# Patient Record
Sex: FEMALE | Race: BLACK OR AFRICAN AMERICAN | Employment: OTHER | URBAN - METROPOLITAN AREA
[De-identification: names, ages, dates, MRNs, and addresses within clinical notes are randomized per-mention and may not be internally consistent; named-entity substitution may affect disease eponyms.]

---

## 2017-05-03 ENCOUNTER — OP HISTORICAL/CONVERTED ENCOUNTER (OUTPATIENT)
Dept: OTHER | Age: 72
End: 2017-05-03

## 2017-05-17 ENCOUNTER — OP HISTORICAL/CONVERTED ENCOUNTER (OUTPATIENT)
Dept: OTHER | Age: 72
End: 2017-05-17

## 2017-05-31 ENCOUNTER — OP HISTORICAL/CONVERTED ENCOUNTER (OUTPATIENT)
Dept: OTHER | Age: 72
End: 2017-05-31

## 2017-11-01 ENCOUNTER — OP HISTORICAL/CONVERTED ENCOUNTER (OUTPATIENT)
Dept: OTHER | Age: 72
End: 2017-11-01

## 2017-11-30 ENCOUNTER — OP HISTORICAL/CONVERTED ENCOUNTER (OUTPATIENT)
Dept: OTHER | Age: 72
End: 2017-11-30

## 2018-02-06 ENCOUNTER — OP HISTORICAL/CONVERTED ENCOUNTER (OUTPATIENT)
Dept: OTHER | Age: 73
End: 2018-02-06

## 2018-06-14 ENCOUNTER — OP HISTORICAL/CONVERTED ENCOUNTER (OUTPATIENT)
Dept: OTHER | Age: 73
End: 2018-06-14

## 2019-01-14 ENCOUNTER — OP HISTORICAL/CONVERTED ENCOUNTER (OUTPATIENT)
Dept: OTHER | Age: 74
End: 2019-01-14

## 2019-01-17 ENCOUNTER — OP HISTORICAL/CONVERTED ENCOUNTER (OUTPATIENT)
Dept: OTHER | Age: 74
End: 2019-01-17

## 2019-01-24 LAB — COLONOSCOPY, EXTERNAL: NORMAL

## 2019-02-08 ENCOUNTER — OP HISTORICAL/CONVERTED ENCOUNTER (OUTPATIENT)
Dept: OTHER | Age: 74
End: 2019-02-08

## 2019-03-04 ENCOUNTER — OP HISTORICAL/CONVERTED ENCOUNTER (OUTPATIENT)
Dept: OTHER | Age: 74
End: 2019-03-04

## 2019-03-20 ENCOUNTER — OP HISTORICAL/CONVERTED ENCOUNTER (OUTPATIENT)
Dept: OTHER | Age: 74
End: 2019-03-20

## 2019-04-04 ENCOUNTER — OP HISTORICAL/CONVERTED ENCOUNTER (OUTPATIENT)
Dept: OTHER | Age: 74
End: 2019-04-04

## 2019-05-20 LAB — HBA1C MFR BLD HPLC: 6.5 %

## 2019-07-24 LAB — MAMMOGRAPHY, EXTERNAL: NORMAL

## 2020-07-17 ENCOUNTER — OP HISTORICAL/CONVERTED ENCOUNTER (OUTPATIENT)
Dept: OTHER | Age: 75
End: 2020-07-17

## 2020-07-17 LAB — CREATININE, EXTERNAL: 0.92

## 2020-07-23 VITALS
DIASTOLIC BLOOD PRESSURE: 66 MMHG | SYSTOLIC BLOOD PRESSURE: 136 MMHG | WEIGHT: 233 LBS | BODY MASS INDEX: 43.99 KG/M2 | HEIGHT: 61 IN

## 2020-07-23 PROBLEM — M35.3 POLYMYALGIA RHEUMATICA (HCC): Status: ACTIVE | Noted: 2020-07-23

## 2020-07-23 PROBLEM — I10 ESSENTIAL HYPERTENSION: Status: ACTIVE | Noted: 2020-07-23

## 2020-07-23 PROBLEM — Z12.39 SCREENING FOR MALIGNANT NEOPLASM OF BREAST: Status: ACTIVE | Noted: 2020-07-23

## 2020-07-23 PROBLEM — E66.01 MORBID OBESITY (HCC): Status: ACTIVE | Noted: 2020-07-23

## 2020-07-23 PROBLEM — E55.9 VITAMIN D DEFICIENCY: Status: ACTIVE | Noted: 2020-07-23

## 2020-07-23 PROBLEM — M54.30 CHRONIC SCIATICA: Status: ACTIVE | Noted: 2020-07-23

## 2020-07-23 PROBLEM — H57.89 DISCHARGE OF EYE: Status: ACTIVE | Noted: 2020-07-23

## 2020-07-23 PROBLEM — K21.9 GASTROESOPHAGEAL REFLUX DISEASE: Status: ACTIVE | Noted: 2020-07-23

## 2020-07-23 PROBLEM — M79.601 PAIN IN RIGHT ARM: Status: ACTIVE | Noted: 2020-07-23

## 2020-07-23 PROBLEM — M48.061 SPINAL STENOSIS OF LUMBAR REGION: Status: ACTIVE | Noted: 2020-07-23

## 2020-07-23 PROBLEM — M17.9 OSTEOARTHRITIS OF KNEE: Status: ACTIVE | Noted: 2020-07-23

## 2020-07-23 PROBLEM — E66.01 SEVERE OBESITY (HCC): Status: ACTIVE | Noted: 2020-07-23

## 2020-07-23 PROBLEM — M15.9 DEGENERATIVE JOINT DISEASE INVOLVING MULTIPLE JOINTS: Status: ACTIVE | Noted: 2020-07-23

## 2020-07-23 PROBLEM — G47.30 SLEEP APNEA: Status: ACTIVE | Noted: 2020-07-23

## 2020-07-23 PROBLEM — M25.511 PAIN IN JOINT OF RIGHT SHOULDER: Status: ACTIVE | Noted: 2020-07-23

## 2020-07-23 PROBLEM — E11.9 DIET-CONTROLLED TYPE 2 DIABETES MELLITUS (HCC): Status: ACTIVE | Noted: 2020-07-23

## 2020-07-23 PROBLEM — M79.639 PAIN IN FOREARM: Status: ACTIVE | Noted: 2020-07-23

## 2020-07-23 PROBLEM — M54.2 NECK PAIN: Status: ACTIVE | Noted: 2020-07-23

## 2020-07-23 PROBLEM — M15.9 OSTEOARTHRITIS OF MULTIPLE JOINTS: Status: ACTIVE | Noted: 2020-07-23

## 2020-07-23 PROBLEM — T75.3XXA MOTION SICKNESS: Status: ACTIVE | Noted: 2020-07-23

## 2020-07-23 PROBLEM — R92.8 MAMMOGRAM ABNORMAL: Status: ACTIVE | Noted: 2020-07-23

## 2020-07-23 PROBLEM — Z79.52 LONG TERM CURRENT USE OF SYSTEMIC STEROIDS: Status: ACTIVE | Noted: 2020-07-23

## 2020-07-23 PROBLEM — T14.8XXA WOUND OF SKIN: Status: ACTIVE | Noted: 2020-07-23

## 2020-07-23 PROBLEM — Z13.220 SCREENING FOR CHOLESTEROL LEVEL: Status: ACTIVE | Noted: 2020-07-23

## 2020-07-23 RX ORDER — MELOXICAM 15 MG/1
15 TABLET ORAL DAILY
COMMUNITY
End: 2020-11-16 | Stop reason: HOSPADM

## 2020-07-23 RX ORDER — LISINOPRIL AND HYDROCHLOROTHIAZIDE 12.5; 2 MG/1; MG/1
TABLET ORAL DAILY
COMMUNITY
End: 2020-10-31

## 2020-07-23 RX ORDER — ASPIRIN 81 MG/1
TABLET ORAL DAILY
COMMUNITY

## 2020-07-23 RX ORDER — AZATHIOPRINE 50 MG/1
50 TABLET ORAL 2 TIMES DAILY
COMMUNITY

## 2020-07-23 RX ORDER — DICLOFENAC SODIUM 10 MG/G
GEL TOPICAL 4 TIMES DAILY
COMMUNITY

## 2020-07-23 RX ORDER — GLUCOSAMINE SULFATE 1500 MG
POWDER IN PACKET (EA) ORAL DAILY
COMMUNITY

## 2020-07-23 RX ORDER — PREDNISONE 5 MG/1
TABLET ORAL
COMMUNITY
End: 2020-08-13

## 2020-08-13 ENCOUNTER — OFFICE VISIT (OUTPATIENT)
Dept: INTERNAL MEDICINE CLINIC | Age: 75
End: 2020-08-13
Payer: MEDICARE

## 2020-08-13 VITALS
HEART RATE: 72 BPM | OXYGEN SATURATION: 98 % | HEIGHT: 61 IN | WEIGHT: 235.6 LBS | SYSTOLIC BLOOD PRESSURE: 128 MMHG | TEMPERATURE: 98.2 F | BODY MASS INDEX: 44.48 KG/M2 | RESPIRATION RATE: 18 BRPM | DIASTOLIC BLOOD PRESSURE: 74 MMHG

## 2020-08-13 VITALS
WEIGHT: 233 LBS | HEART RATE: 84 BPM | HEIGHT: 61 IN | BODY MASS INDEX: 43.99 KG/M2 | DIASTOLIC BLOOD PRESSURE: 66 MMHG | SYSTOLIC BLOOD PRESSURE: 136 MMHG | TEMPERATURE: 98 F | RESPIRATION RATE: 16 BRPM | OXYGEN SATURATION: 100 %

## 2020-08-13 DIAGNOSIS — E11.9 DIET-CONTROLLED TYPE 2 DIABETES MELLITUS (HCC): ICD-10-CM

## 2020-08-13 DIAGNOSIS — J30.9 ALLERGIC RHINITIS, UNSPECIFIED SEASONALITY, UNSPECIFIED TRIGGER: ICD-10-CM

## 2020-08-13 DIAGNOSIS — G47.30 SLEEP APNEA, UNSPECIFIED TYPE: ICD-10-CM

## 2020-08-13 DIAGNOSIS — I10 ESSENTIAL HYPERTENSION: Primary | ICD-10-CM

## 2020-08-13 DIAGNOSIS — M54.30 CHRONIC SCIATICA, UNSPECIFIED LATERALITY: ICD-10-CM

## 2020-08-13 DIAGNOSIS — M48.061 SPINAL STENOSIS OF LUMBAR REGION, UNSPECIFIED WHETHER NEUROGENIC CLAUDICATION PRESENT: ICD-10-CM

## 2020-08-13 DIAGNOSIS — E66.01 SEVERE OBESITY (HCC): ICD-10-CM

## 2020-08-13 DIAGNOSIS — M35.3 POLYMYALGIA RHEUMATICA (HCC): ICD-10-CM

## 2020-08-13 DIAGNOSIS — Z13.220 SCREENING FOR CHOLESTEROL LEVEL: ICD-10-CM

## 2020-08-13 DIAGNOSIS — M15.9 OSTEOARTHRITIS OF MULTIPLE JOINTS, UNSPECIFIED OSTEOARTHRITIS TYPE: ICD-10-CM

## 2020-08-13 DIAGNOSIS — E78.00 PURE HYPERCHOLESTEROLEMIA: ICD-10-CM

## 2020-08-13 PROCEDURE — 99214 OFFICE O/P EST MOD 30 MIN: CPT | Performed by: INTERNAL MEDICINE

## 2020-08-13 RX ORDER — CETIRIZINE HCL 10 MG
10 TABLET ORAL DAILY
Qty: 30 TAB | Refills: 2 | Status: SHIPPED | OUTPATIENT
Start: 2020-08-13 | End: 2021-04-20

## 2020-08-13 RX ORDER — PREGABALIN 75 MG/1
75 CAPSULE ORAL 2 TIMES DAILY
Qty: 60 CAP | Refills: 2 | Status: SHIPPED | OUTPATIENT
Start: 2020-08-13 | End: 2020-11-16 | Stop reason: SDUPTHER

## 2020-08-13 RX ORDER — FLUTICASONE PROPIONATE 50 MCG
2 SPRAY, SUSPENSION (ML) NASAL DAILY
Qty: 1 BOTTLE | Refills: 2 | Status: SHIPPED | OUTPATIENT
Start: 2020-08-13 | End: 2022-04-19

## 2020-08-13 NOTE — PROGRESS NOTES
Ezekiel Cuevas is a 76 y.o. female and presents with Hypertension; Diabetes; and Follow Up Chronic Condition    According to her she has chronic pain, all the time,In her back in both gluteal region according to her nothing helps. She already takes gabapentin 300 mg 3 times a day and in the past she had lumbar spinal stenosis. ,  She also has polymyalgia rheumatica but now she is off from steroids because she had elevated hemoglobin A1c and last hemoglobin A1c was 6.5% and she did not want to be on steroid. Now she is off from steroid. She is taking azathioprine. She does not take methotrexate. She is not able to do any exercise. Her blood pressure is controlled with current medication regimen. He has no depression. Her sister also has SLE. Patient also has DJD of joints. She is due for her blood work but she had her labs done recently in July with her rheumatologist.  According to her orthopedic surgeon Dr. Juan Gaxiola gave her a gel injection in the right knee joint and she will get 3 injections. CMP is stable but she had a mildly low hemoglobin. Her BMI is 44.5. I had a long discussion regarding use of pregabalin and she agreed if her insurance approves. She had her  Last visit with me in December 2019 and last hemoglobin A1c was 6.5% in May 2019. She has postnasal drainage and sometimes left ear fullness      Review of Systems   Constitutional: Negative for chills, fever, malaise/fatigue and weight loss. HENT: Negative for ear discharge, ear pain, hearing loss, sinus pain and sore throat. Postnasal drainage   Eyes: Negative for blurred vision. Respiratory: Negative for cough, shortness of breath and wheezing. Cardiovascular: Negative. Gastrointestinal: Negative for diarrhea, heartburn and nausea. Genitourinary: Negative for dysuria, flank pain, frequency and urgency. Musculoskeletal: Positive for back pain and joint pain.    Neurological: Negative for dizziness, tingling and sensory change. Psychiatric/Behavioral: Negative for depression. The patient is not nervous/anxious.          Past Medical History:   Diagnosis Date    Bronchitis     Chronic sciatica 7/23/2020    Degenerative joint disease involving multiple joints 7/23/2020    Diet-controlled type 2 diabetes mellitus ([facility] 7/23/2020    Discharge of eye 7/23/2020    Essential hypertension 7/23/2020    Gastroesophageal reflux disease 7/23/2020    Long term current use of systemic steroids 7/23/2020    Mammogram abnormal 7/23/2020    Morbid obesity ([facility] 7/23/2020    Motion sickness 7/23/2020    Neck pain 7/23/2020    Osteoarthritis of knee 7/23/2020    Osteoarthritis of multiple joints 7/23/2020    Pain in forearm 7/23/2020    Pain in joint of right shoulder 7/23/2020    Pain in right arm 7/23/2020    Polymyalgia rheumatica ([facility] [address] 7/23/2020    Screening for cholesterol level 7/23/2020    Screening for malignant neoplasm of breast 7/23/2020    Severe obesity ([facility] [address] 7/23/2020    Sleep apnea 7/23/2020    Spinal stenosis of lumbar region 7/23/2020    Vitamin D deficiency 7/23/2020    Wound of skin 7/23/2020     Past Surgical History:   Procedure Laterality Date    HX BREAST BIOPSY      HX COLONOSCOPY       Social History     Socioeconomic History    Marital status:      Spouse name: Not on file    Number of children: Not on file    Years of education: Not on file    Highest education level: Not on file   Tobacco Use    Smoking status: Never Smoker    Smokeless tobacco: Never Used   Substance and Sexual Activity    Alcohol use: Not Currently    Drug use: Never     Family History   Problem Relation Age of Onset    Heart Disease Mother     Diabetes Mother     Stroke Mother     Heart Disease Father     Stroke Father     Lupus Sister      Current Outpatient Medications   Medication Sig Dispense Refill    fluticasone propionate (FLONASE) 50 mcg/actuation nasal spray 2 Sprays by Both Nostrils route daily. 1 Bottle 2    cetirizine (ZYRTEC) 10 mg tablet Take 1 Tab by mouth daily. 30 Tab 2    pregabalin (LYRICA) 75 mg capsule Take 1 Cap by mouth two (2) times a day. Max Daily Amount: 150 mg. 60 Cap 2    aspirin delayed-release 81 mg tablet Take  by mouth daily.  azaTHIOprine (IMURAN) 50 mg tablet Take 50 mg by mouth daily.  folic acid/multivit-min/lutein (CENTRUM SILVER PO) Take  by mouth.  diclofenac (VOLTAREN) 1 % gel Apply  to affected area four (4) times daily.  lisinopril-hydroCHLOROthiazide (PRINZIDE, ZESTORETIC) 20-12.5 mg per tablet Take  by mouth daily.  meloxicam (MOBIC) 15 mg tablet Take 15 mg by mouth daily.  cholecalciferol (Vitamin D3) 25 mcg (1,000 unit) cap Take  by mouth daily. Allergies   Allergen Reactions    Bactrim [Sulfamethoprim] Itching       Objective:  Visit Vitals  /74 (BP 1 Location: Left arm, BP Patient Position: Sitting)   Pulse 72   Temp 98.2 °F (36.8 °C) (Temporal)   Resp 18   Ht 5' 1\" (1.549 m)   Wt 235 lb 9.6 oz (106.9 kg)   SpO2 98%   BMI 44.52 kg/m²       Physical Exam:   Constitutional: General Appearance: healthy-appearing / obese. Level of Distress: NAD. Ambulation: ambulating   Psychiatric: Mental Status: normal mood and affect and active and alert. Orientation: to time, place, and person. no agitation. ,normal eye contact. normal insight  Head: Head: normocephalic and atraumatic. Eyes: Pupils: PERRLA. Sclerae: non-icteric. Ear examination  Right ear mild wax. Left ear normal.  Neck: Neck: supple, trachea midline, and no masses. Lymph Nodes: no cervical LAD. Thyroid: no enlargement or nodules and non-tender. Lungs: Respiratory effort: no dyspnea. Auscultation: no wheezing, rales/crackles, or rhonchi and breath sounds normal and good air movement. Cardiovascular: Apical Impulse: not displaced. Heart Auscultation: normal S1 and S2; no murmurs, rubs, or gallops; and RRR. Neck vessels: no carotid bruits.  Pulses including femoral / pedal: normal throughout. Abdomen: Bowel Sounds: normal. Inspection and Palpation: no tenderness, guarding, or masses and soft and non-distended. Liver: non-tender and no hepatomegaly. Spleen: non-tender and no splenomegaly. Musculoskeletal[de-identified] Extremities: no edema,no varicosities. No Calf tenderness. Neurologic: Gait and Station: normal gait and station. Motor Strength normal right and left. Skin: Inspection and palpation: no rash, lesions, or ulcer. Results for orders placed or performed in visit on 08/13/20    MAMMOGRAPHY   Result Value Ref Range    Mammography, External repeat in 1 year    AMB EXT HGBA1C   Result Value Ref Range    Hemoglobin A1c, External 6.5    HM COLONOSCOPY   Result Value Ref Range    Colonoscopy, External repeat in 10 years        Assessment/Plan:    ICD-10-CM ICD-9-CM    1. Essential hypertension  I10 401.9 CBC WITH AUTOMATED DIFF      TSH 3RD GENERATION      CANCELED: METABOLIC PANEL, COMPREHENSIVE   2. Diet-controlled type 2 diabetes mellitus (HCC)  E11.9 250.00 HEMOGLOBIN A1C WITH EAG   3. Spinal stenosis of lumbar region, unspecified whether neurogenic claudication present  M48.061 724.02 pregabalin (LYRICA) 75 mg capsule   4. Allergic rhinitis, unspecified seasonality, unspecified trigger  J30.9 477.9    5. Polymyalgia rheumatica (HCC)  M35.3 725    6. Chronic sciatica, unspecified laterality  M54.30 724.3 pregabalin (LYRICA) 75 mg capsule   7. Osteoarthritis of multiple joints, unspecified osteoarthritis type  M15.9 715.89    8. Sleep apnea, unspecified type  G47.30 780.57    9. Severe obesity (HCC)  E66.01 278.01    10. Screening for cholesterol level  Z13.220 V77.91 LIPID PANEL   11.  Pure hypercholesterolemia  E78.00 272.0 LIPID PANEL     Orders Placed This Encounter    CBC WITH AUTOMATED DIFF    LIPID PANEL    HEMOGLOBIN A1C WITH EAG    TSH 3RD GENERATION    LIPID PANEL    fluticasone propionate (FLONASE) 50 mcg/actuation nasal spray Si Sprays by Both Nostrils route daily. Dispense:  1 Bottle     Refill:  2    cetirizine (ZYRTEC) 10 mg tablet     Sig: Take 1 Tab by mouth daily. Dispense:  30 Tab     Refill:  2    pregabalin (LYRICA) 75 mg capsule     Sig: Take 1 Cap by mouth two (2) times a day. Max Daily Amount: 150 mg. Dispense:  60 Cap     Refill:  2         hypertension controlled continue lisinopril hydrochlorothiazide 20/12.5 mg once a day diet low in salt. type 2 diabetes mellitus. hemoglobin A1c is 6.5% in the May, now she is off from steroids so I will repeat her hemoglobin A1c. Bilateral pain in gluteal region with back pain due to lumbar spinal stenosis and most likely due to lumbar radiculopathy, she is taking gabapentin 300 mg 3 times a day but she has constant resting pain and not improving but she is hesitating to change the medicine to Cymbalta or Lyrica after a long discussion she agreed to try low-dose of pregabalin I sent the refill to the pharmacy 75 mg twice a day to begin with and if she wants to stop she will stop gradually and side effects discussed. For postnasal drainage started on cetirizine and she can take fluticasone nasal spray. Slight wax in the right ear.    ,  Polymyalgia rheumatica  getting azathioprine 50 mg twice a day. . she could not take methotrexate, she is getting gabapentin. osteoarthritis in knee joints Symptomatic treatment. morbid obesity lifestyle modification. Needs regular blood work being on long-term use of azathioprine, she has morbid obesity but she is not able to lose weight because she cannot walk or do exercise due to chronic pain and she does not believe in taking more medications for pain relief, she can be benefited by physical therapy and stretching exercise, but she does not want to do for now.,  She can get opinion from her orthopedic surgeon that she is going for her related to the knee problems.   Concern     I reviewed her previous labs in previous encounters and discussed with her. I went in [place] and reviewed her medications and labs done by rheumatologist.  Follow-up in 3 months. Labs ordered. Screening for cholesterol ordered. lose weight, increase physical activity, follow low fat diet, routine labs ordered, call if any problems    There are no Patient Instructions on file for this visit. Follow-up and Dispositions    · Return in about 3 months (around 11/13/2020) for htn ,sciatica ,lumbar spinal stenosis ,multiple co morbidities ,fasting labs now.

## 2020-08-19 ENCOUNTER — OP HISTORICAL/CONVERTED ENCOUNTER (OUTPATIENT)
Dept: OTHER | Age: 75
End: 2020-08-19

## 2020-08-22 PROBLEM — Z12.39 SCREENING FOR MALIGNANT NEOPLASM OF BREAST: Status: RESOLVED | Noted: 2020-07-23 | Resolved: 2020-08-22

## 2020-08-22 PROBLEM — Z13.220 SCREENING FOR CHOLESTEROL LEVEL: Status: RESOLVED | Noted: 2020-07-23 | Resolved: 2020-08-22

## 2020-09-15 LAB
CHOLEST SERPL-MCNC: 192 MG/DL (ref 100–199)
HDLC SERPL-MCNC: 68 MG/DL
LDLC SERPL CALC-MCNC: 106 MG/DL (ref 0–99)
TRIGL SERPL-MCNC: 101 MG/DL (ref 0–149)
VLDLC SERPL CALC-MCNC: 18 MG/DL (ref 5–40)

## 2020-11-16 ENCOUNTER — OFFICE VISIT (OUTPATIENT)
Dept: INTERNAL MEDICINE CLINIC | Age: 75
End: 2020-11-16
Payer: MEDICARE

## 2020-11-16 VITALS
DIASTOLIC BLOOD PRESSURE: 74 MMHG | HEART RATE: 72 BPM | BODY MASS INDEX: 45.05 KG/M2 | SYSTOLIC BLOOD PRESSURE: 134 MMHG | OXYGEN SATURATION: 99 % | RESPIRATION RATE: 18 BRPM | HEIGHT: 61 IN | WEIGHT: 238.6 LBS

## 2020-11-16 DIAGNOSIS — R92.8 ABNORMAL MAMMOGRAM OF LEFT BREAST: ICD-10-CM

## 2020-11-16 DIAGNOSIS — M15.9 PRIMARY OSTEOARTHRITIS INVOLVING MULTIPLE JOINTS: ICD-10-CM

## 2020-11-16 DIAGNOSIS — M48.061 SPINAL STENOSIS OF LUMBAR REGION, UNSPECIFIED WHETHER NEUROGENIC CLAUDICATION PRESENT: ICD-10-CM

## 2020-11-16 DIAGNOSIS — M54.30 CHRONIC SCIATICA, UNSPECIFIED LATERALITY: ICD-10-CM

## 2020-11-16 DIAGNOSIS — M35.3 POLYMYALGIA RHEUMATICA (HCC): ICD-10-CM

## 2020-11-16 DIAGNOSIS — E66.01 MORBID OBESITY (HCC): ICD-10-CM

## 2020-11-16 DIAGNOSIS — E11.9 DIET-CONTROLLED TYPE 2 DIABETES MELLITUS (HCC): ICD-10-CM

## 2020-11-16 DIAGNOSIS — G47.30 SLEEP APNEA, UNSPECIFIED TYPE: ICD-10-CM

## 2020-11-16 DIAGNOSIS — I10 ESSENTIAL HYPERTENSION: ICD-10-CM

## 2020-11-16 PROCEDURE — 99214 OFFICE O/P EST MOD 30 MIN: CPT | Performed by: INTERNAL MEDICINE

## 2020-11-16 RX ORDER — PREGABALIN 75 MG/1
100 CAPSULE ORAL 2 TIMES DAILY
Qty: 60 CAP | Refills: 2 | Status: SHIPPED | OUTPATIENT
Start: 2020-11-16 | End: 2021-02-16 | Stop reason: DRUGHIGH

## 2020-11-16 RX ORDER — LISINOPRIL AND HYDROCHLOROTHIAZIDE 12.5; 2 MG/1; MG/1
1 TABLET ORAL EVERY MORNING
Qty: 90 TAB | Refills: 3 | Status: SHIPPED | OUTPATIENT
Start: 2020-11-16 | End: 2022-01-17

## 2020-11-16 NOTE — PROGRESS NOTES
Ahmet Bolden is a 76 y.o. female and presents with Follow Up Chronic Condition (htn,dm)    In last visit I had started her on pregabalin 75 mg twice a day for chronic pain in her back and sciatica and lumbar radiculopathy that was not getting relieved with NSAIDs or Tylenol or, gabapentin she has stopped taking gabapentin and she told me she is getting, some relief and she wants to continue and I had discussed various dose and she agreed to try now with 100 mg twice a day instead of 75 mg twice a day, she  does not like to take too much medication she follows rheumatologist having polymyalgia rheumatica and now she is off from steroid, she is taking azathioprine, her blood pressure is, controlled with current medication she had done her lipid profile in July and it was controlled she is compliant taking medicines, she is not walking that much in sedentary due to COVID-19 she stays in home,. No negative thoughts or depression. No tingling numbness or neurological weakness in legs. Review of Systems    Review of Systems   Constitutional: Negative. HENT: Negative for hearing loss. Eyes: Negative for blurred vision. Respiratory: Negative for cough, shortness of breath and wheezing. Cardiovascular: Negative. Gastrointestinal: Negative. Genitourinary: Negative. Musculoskeletal: Negative for falls, joint pain and myalgias. Skin: Negative for itching and rash. Neurological: Negative for dizziness, tingling, speech change and headaches. Endo/Heme/Allergies: Negative for polydipsia. Psychiatric/Behavioral: Negative for depression. The patient is not nervous/anxious and does not have insomnia.          Past Medical History:   Diagnosis Date    Bronchitis     Chronic sciatica 7/23/2020    Degenerative joint disease involving multiple joints 7/23/2020    Diet-controlled type 2 diabetes mellitus ([facility] [address] 7/23/2020    Discharge of eye 7/23/2020    Essential hypertension 7/23/2020    Gastroesophageal reflux disease 7/23/2020    Long term current use of systemic steroids 7/23/2020    Mammogram abnormal 7/23/2020    Morbid obesity ([facility] [address] 7/23/2020    Motion sickness 7/23/2020    Neck pain 7/23/2020    Osteoarthritis of knee 7/23/2020    Osteoarthritis of multiple joints 7/23/2020    Pain in forearm 7/23/2020    Pain in joint of right shoulder 7/23/2020    Pain in right arm 7/23/2020    Polymyalgia rheumatica ([facility] [address] 7/23/2020    Screening for cholesterol level 7/23/2020    Screening for malignant neoplasm of breast 7/23/2020    Severe obesity ([facility] [address] 7/23/2020    Sleep apnea 7/23/2020    Spinal stenosis of lumbar region 7/23/2020    Vitamin D deficiency 7/23/2020    Wound of skin 7/23/2020     Past Surgical History:   Procedure Laterality Date    HX BREAST BIOPSY      HX COLONOSCOPY       Social History     Socioeconomic History    Marital status:      Spouse name: Not on file    Number of children: Not on file    Years of education: Not on file    Highest education level: Not on file   Tobacco Use    Smoking status: Never Smoker    Smokeless tobacco: Never Used   Substance and Sexual Activity    Alcohol use: Not Currently    Drug use: Never     Family History   Problem Relation Age of Onset    Heart Disease Mother     Diabetes Mother     Stroke Mother     Heart Disease Father     Stroke Father     Lupus Sister      Current Outpatient Medications   Medication Sig Dispense Refill    lisinopril-hydroCHLOROthiazide (PRINZIDE, ZESTORETIC) 20-12.5 mg per tablet Take 1 Tab by mouth Every morning. 90 Tab 3    pregabalin (LYRICA) 75 mg capsule Take 1 Cap by mouth two (2) times a day. Max Daily Amount: 150 mg. Indications: nerve pain from spinal cord injury 60 Cap 2    fluticasone propionate (FLONASE) 50 mcg/actuation nasal spray 2 Sprays by Both Nostrils route daily. 1 Bottle 2    cetirizine (ZYRTEC) 10 mg tablet Take 1 Tab by mouth daily.  30 Tab 2    aspirin delayed-release 81 mg tablet Take  by mouth daily.  azaTHIOprine (IMURAN) 50 mg tablet Take 50 mg by mouth daily.  folic acid/multivit-min/lutein (CENTRUM SILVER PO) Take  by mouth.  diclofenac (VOLTAREN) 1 % gel Apply  to affected area four (4) times daily.  cholecalciferol (Vitamin D3) 25 mcg (1,000 unit) cap Take  by mouth daily. Allergies   Allergen Reactions    Bactrim [Sulfamethoprim] Itching       Objective:  Visit Vitals  /74 (BP 1 Location: Left arm, BP Patient Position: Sitting)   Pulse 72   Resp 18   Ht 5' 1\" (1.549 m)   Wt 238 lb 9.6 oz (108.2 kg)   SpO2 99%   BMI 45.08 kg/m²       Physical Exam:   Constitutional: General Appearance: . Pleasant personality with morbid obesity level of Distress: NAD. Psychiatric: Mental Status: normal mood and affect Orientation: to time, place, and person. ,normal eye contact. Head: Head: normocephalic and atraumatic. Eyes: Pupils: PERRLA. Sclerae: non-icteric. Neck: Neck: supple, trachea midline, and no masses. Lymph Nodes: no cervical LAD. Thyroid: no enlargement or nodules and non-tender. Lungs: Respiratory effort: no dyspnea. Auscultation: no wheezing, rales/crackles, or rhonchi and breath sounds normal and good air movement. Cardiovascular: Apical Impulse: not displaced. Heart Auscultation: normal S1 and S2; no murmurs, rubs, or gallops; and RRR. Neck vessels: no carotid bruits. Pulses including femoral / pedal: normal throughout. Abdomen: Bowel Sounds: normal. Inspection and Palpation: no tenderness, guarding, or masses and soft and non-distended. Liver: non-tender and no hepatomegaly. Spleen: non-tender and no splenomegaly. Musculoskeletal[de-identified] Extremities: no edema,no varicosities. No Calf tenderness. Neurologic: Gait and Station: normal gait and station. Motor Strength normal right and left. Skin: Inspection and palpation: no rash, lesions, or ulcer.        Results for orders placed or performed in visit on 08/13/20 LIPID PANEL   Result Value Ref Range    Cholesterol, total 192 100 - 199 mg/dL    Triglyceride 101 0 - 149 mg/dL    HDL Cholesterol 68 >39 mg/dL    VLDL Cholesterol [place] 18 5 - 40 mg/dL    LDL Chol Calc ([facility]) 106 (H) 0 - 99 mg/dL       Assessment/Plan:      ICD-10-CM ICD-9-CM    1. Essential hypertension  I10 401.9 CBC WITH AUTOMATED DIFF      METABOLIC PANEL, COMPREHENSIVE      TSH 3RD GENERATION      URINALYSIS W/ RFLX MICROSCOPIC   2. Spinal stenosis of lumbar region, unspecified whether neurogenic claudication present  M48.061 724.02 pregabalin (LYRICA) 75 mg capsule   3. Diet-controlled type 2 diabetes mellitus (HCC)  E11.9 250.00 HEMOGLOBIN A1C WITH EAG   4. Chronic sciatica, unspecified laterality  M54.30 724.3 pregabalin (LYRICA) 75 mg capsule   5. Polymyalgia rheumatica (HCC)  M35.3 725    6. Primary osteoarthritis involving multiple joints  M89.49 715.98    7. Sleep apnea, unspecified type  G47.30 780.57    8. Morbid obesity ([address]  E66.01 278.01    9. Abnormal mammogram of left breast  R92.8 793.80 US BREAST LT COMPLETE 4 QUAD     Orders Placed This Encounter    US BREAST LT COMPLETE 4 QUAD     Standing Status:   Future     Standing Expiration Date:   12/16/2021     Scheduling Instructions:      Kelby Michael      [address], [address]      Phone: [phone] CBC WITH AUTOMATED DIFF    METABOLIC PANEL, COMPREHENSIVE    TSH 3RD GENERATION    HEMOGLOBIN A1C WITH EAG    URINALYSIS W/ RFLX MICROSCOPIC    lisinopril-hydroCHLOROthiazide (PRINZIDE, ZESTORETIC) 20-12.5 mg per tablet     Sig: Take 1 Tab by mouth Every morning. Dispense:  90 Tab     Refill:  3    pregabalin (LYRICA) 75 mg capsule     Sig: Take 1 Cap by mouth two (2) times a day. Max Daily Amount: 150 mg.  Indications: nerve pain from spinal cord injury     Dispense:  60 Cap     Refill:  2       Hypertension, initially it was slightly on upper side of normal range but after resting it was normal I will not make changes in the medicines we will keep close monitoring of blood pressure and continued on,Lisinopril hydrochlorothiazide 20/12.5 mg once a day. Diet low in sodium. Polymyalgia rheumatica taking long-term use of azathioprine and now she is off from steroid. Continue multivitamins. DJD in back with history of lumbar spinal stenosis and history of sciatica and lumbar radiculopathy and  back pain which was not getting better taking Tylenol and NSAIDs and also maintenance dose of steroid and, gabapentin she is off from gabapentin she has been started on pregabalin 75 mg twice a day and she has somewhat relief, in her back pain, and radiating pain I increased 200 mg twice a day after discussing with her and she agreed, refills sent to the pharmacy. She has no adverse side effects taking, pregabalin. Hypercholesterolemia her lipid profile was controlled. Type 2 diabetes mellitus controlled with diet her last hemoglobin A1c was 6.5% in that time she was on low-dose of steroid but now she is off from steroid, given for polymyalgia rheumatica by rheumatologist I ordered labs again. ,    She wanted to have orders for ultrasound of the left breast that was, recommended by radiologist I ordered. labs ordered and lipid profile was controlled discussed with her and refills given follow-up in 3 months. According to her she has taken flu vaccine. lose weight, increase physical activity, follow low fat diet, follow low salt diet, continue present plan, have labs drawn prior to ROV, call if any problems    There are no Patient Instructions on file for this visit. Follow-up and Dispositions    · Return in about 3 months (around 2/16/2021) for htn ,and others ,sciatica and nonfasting labs now.

## 2021-02-05 LAB
ALBUMIN SERPL-MCNC: 4.1 G/DL (ref 3.7–4.7)
ALBUMIN/GLOB SERPL: 1.5 {RATIO} (ref 1.2–2.2)
ALP SERPL-CCNC: 110 IU/L (ref 39–117)
ALT SERPL-CCNC: 14 IU/L (ref 0–32)
APPEARANCE UR: CLEAR
AST SERPL-CCNC: 21 IU/L (ref 0–40)
BASOPHILS # BLD AUTO: 0 X10E3/UL (ref 0–0.2)
BASOPHILS NFR BLD AUTO: 0 %
BILIRUB SERPL-MCNC: <0.2 MG/DL (ref 0–1.2)
BILIRUB UR QL STRIP: NEGATIVE
BUN SERPL-MCNC: 14 MG/DL (ref 8–27)
BUN/CREAT SERPL: 16 (ref 12–28)
CALCIUM SERPL-MCNC: 9.7 MG/DL (ref 8.7–10.3)
CHLORIDE SERPL-SCNC: 102 MMOL/L (ref 96–106)
CO2 SERPL-SCNC: 22 MMOL/L (ref 20–29)
COLOR UR: YELLOW
CREAT SERPL-MCNC: 0.85 MG/DL (ref 0.57–1)
EOSINOPHIL # BLD AUTO: 0.2 X10E3/UL (ref 0–0.4)
EOSINOPHIL NFR BLD AUTO: 3 %
ERYTHROCYTE [DISTWIDTH] IN BLOOD BY AUTOMATED COUNT: 14.1 % (ref 11.7–15.4)
EST. AVERAGE GLUCOSE BLD GHB EST-MCNC: 146 MG/DL
GLOBULIN SER CALC-MCNC: 2.7 G/DL (ref 1.5–4.5)
GLUCOSE SERPL-MCNC: 130 MG/DL (ref 65–99)
GLUCOSE UR QL: NEGATIVE
HBA1C MFR BLD: 6.7 % (ref 4.8–5.6)
HCT VFR BLD AUTO: 35.9 % (ref 34–46.6)
HGB BLD-MCNC: 11.5 G/DL (ref 11.1–15.9)
HGB UR QL STRIP: NEGATIVE
IMM GRANULOCYTES # BLD AUTO: 0 X10E3/UL (ref 0–0.1)
IMM GRANULOCYTES NFR BLD AUTO: 1 %
KETONES UR QL STRIP: NEGATIVE
LEUKOCYTE ESTERASE UR QL STRIP: NEGATIVE
LYMPHOCYTES # BLD AUTO: 2.4 X10E3/UL (ref 0.7–3.1)
LYMPHOCYTES NFR BLD AUTO: 35 %
MCH RBC QN AUTO: 26 PG (ref 26.6–33)
MCHC RBC AUTO-ENTMCNC: 32 G/DL (ref 31.5–35.7)
MCV RBC AUTO: 81 FL (ref 79–97)
MICRO URNS: NORMAL
MONOCYTES # BLD AUTO: 0.7 X10E3/UL (ref 0.1–0.9)
MONOCYTES NFR BLD AUTO: 10 %
NEUTROPHILS # BLD AUTO: 3.6 X10E3/UL (ref 1.4–7)
NEUTROPHILS NFR BLD AUTO: 51 %
NITRITE UR QL STRIP: NEGATIVE
PH UR STRIP: 7.5 [PH] (ref 5–7.5)
PLATELET # BLD AUTO: 322 X10E3/UL (ref 150–450)
POTASSIUM SERPL-SCNC: 4.6 MMOL/L (ref 3.5–5.2)
PROT SERPL-MCNC: 6.8 G/DL (ref 6–8.5)
PROT UR QL STRIP: NEGATIVE
RBC # BLD AUTO: 4.43 X10E6/UL (ref 3.77–5.28)
SODIUM SERPL-SCNC: 141 MMOL/L (ref 134–144)
SP GR UR: 1.02 (ref 1–1.03)
TSH SERPL DL<=0.005 MIU/L-ACNC: 1.98 UIU/ML (ref 0.45–4.5)
UROBILINOGEN UR STRIP-MCNC: 1 MG/DL (ref 0.2–1)
WBC # BLD AUTO: 6.9 X10E3/UL (ref 3.4–10.8)

## 2021-02-05 NOTE — PROGRESS NOTES
I will discuss her labs when she comes on 16th February in detail but you can let her know. Her labs are stable. Her hemoglobin A1c is stable she is trying to manage with lifestyle modification. She does not want to be on medications.

## 2021-02-16 ENCOUNTER — OFFICE VISIT (OUTPATIENT)
Dept: INTERNAL MEDICINE CLINIC | Age: 76
End: 2021-02-16
Payer: MEDICARE

## 2021-02-16 VITALS
HEIGHT: 61 IN | DIASTOLIC BLOOD PRESSURE: 60 MMHG | HEART RATE: 72 BPM | BODY MASS INDEX: 45.5 KG/M2 | WEIGHT: 241 LBS | SYSTOLIC BLOOD PRESSURE: 130 MMHG | RESPIRATION RATE: 18 BRPM | OXYGEN SATURATION: 99 %

## 2021-02-16 DIAGNOSIS — E66.01 MORBID OBESITY (HCC): ICD-10-CM

## 2021-02-16 DIAGNOSIS — M15.9 PRIMARY OSTEOARTHRITIS INVOLVING MULTIPLE JOINTS: ICD-10-CM

## 2021-02-16 DIAGNOSIS — I10 ESSENTIAL HYPERTENSION: ICD-10-CM

## 2021-02-16 DIAGNOSIS — K21.9 GASTROESOPHAGEAL REFLUX DISEASE WITHOUT ESOPHAGITIS: ICD-10-CM

## 2021-02-16 DIAGNOSIS — E11.8 CONTROLLED TYPE 2 DIABETES MELLITUS WITH COMPLICATION, WITHOUT LONG-TERM CURRENT USE OF INSULIN (HCC): ICD-10-CM

## 2021-02-16 DIAGNOSIS — M48.061 SPINAL STENOSIS OF LUMBAR REGION, UNSPECIFIED WHETHER NEUROGENIC CLAUDICATION PRESENT: ICD-10-CM

## 2021-02-16 DIAGNOSIS — R92.8 ABNORMAL MAMMOGRAM: ICD-10-CM

## 2021-02-16 DIAGNOSIS — M35.3 POLYMYALGIA RHEUMATICA (HCC): ICD-10-CM

## 2021-02-16 DIAGNOSIS — M17.0 OSTEOARTHRITIS OF BOTH KNEES, UNSPECIFIED OSTEOARTHRITIS TYPE: ICD-10-CM

## 2021-02-16 LAB — GLUCOSE POC: 134 MG/DL

## 2021-02-16 PROCEDURE — G8752 SYS BP LESS 140: HCPCS | Performed by: INTERNAL MEDICINE

## 2021-02-16 PROCEDURE — G8536 NO DOC ELDER MAL SCRN: HCPCS | Performed by: INTERNAL MEDICINE

## 2021-02-16 PROCEDURE — 3044F HG A1C LEVEL LT 7.0%: CPT | Performed by: INTERNAL MEDICINE

## 2021-02-16 PROCEDURE — 82962 GLUCOSE BLOOD TEST: CPT | Performed by: INTERNAL MEDICINE

## 2021-02-16 PROCEDURE — 1101F PT FALLS ASSESS-DOCD LE1/YR: CPT | Performed by: INTERNAL MEDICINE

## 2021-02-16 PROCEDURE — G8417 CALC BMI ABV UP PARAM F/U: HCPCS | Performed by: INTERNAL MEDICINE

## 2021-02-16 PROCEDURE — G8432 DEP SCR NOT DOC, RNG: HCPCS | Performed by: INTERNAL MEDICINE

## 2021-02-16 PROCEDURE — G8427 DOCREV CUR MEDS BY ELIG CLIN: HCPCS | Performed by: INTERNAL MEDICINE

## 2021-02-16 PROCEDURE — G8400 PT W/DXA NO RESULTS DOC: HCPCS | Performed by: INTERNAL MEDICINE

## 2021-02-16 PROCEDURE — G8754 DIAS BP LESS 90: HCPCS | Performed by: INTERNAL MEDICINE

## 2021-02-16 PROCEDURE — 1090F PRES/ABSN URINE INCON ASSESS: CPT | Performed by: INTERNAL MEDICINE

## 2021-02-16 PROCEDURE — 2022F DILAT RTA XM EVC RTNOPTHY: CPT | Performed by: INTERNAL MEDICINE

## 2021-02-16 PROCEDURE — 99214 OFFICE O/P EST MOD 30 MIN: CPT | Performed by: INTERNAL MEDICINE

## 2021-02-16 PROCEDURE — 3017F COLORECTAL CA SCREEN DOC REV: CPT | Performed by: INTERNAL MEDICINE

## 2021-02-16 RX ORDER — METFORMIN HYDROCHLORIDE 500 MG/1
500 TABLET, FILM COATED, EXTENDED RELEASE ORAL DAILY
Qty: 30 TAB | Refills: 2 | Status: SHIPPED | OUTPATIENT
Start: 2021-02-16 | End: 2021-06-15

## 2021-02-16 RX ORDER — TIZANIDINE 2 MG/1
2 TABLET ORAL
Qty: 30 TAB | Refills: 2 | Status: SHIPPED | OUTPATIENT
Start: 2021-02-16 | End: 2021-07-29

## 2021-02-16 RX ORDER — PREGABALIN 150 MG/1
150 CAPSULE ORAL 2 TIMES DAILY
Qty: 180 CAP | Refills: 0 | Status: SHIPPED | OUTPATIENT
Start: 2021-02-16 | End: 2021-04-06

## 2021-02-16 NOTE — PROGRESS NOTES
Benny Rosales is a 76 y.o. female and presents with Follow Up Chronic Condition (htn)      Ms. Fausto Ocasio came for follow-up. She is known to have chronic low back pain due to lumbar spinal stenosis and sciatica and having polymyalgia rheumatica and also already getting azathioprine from the rheumatologist.  She did not get any relief getting gabapentin higher dose so I had switched over to pre-Jabaley on lower dose and she told me she is feeling somewhat better so increase dose today to 150 mg twice a day and she agreed she feels some muscle spasm and stiffness in her joints especially at night. She does not take any muscle relaxant. Her hemoglobin A1c increased to 6.7% from 6.4%. She is not on steroid supplementation that has been stopped for last several months by rheumatologist, started on Metformin low-dose to begin with and she told me that, she was aware that Metformin causes lot of side effects, recommended to try it. Her blood pressure is controlled with current medication regimen. She is compliant with medications. She has no,Depression. Her BMI is 45.54. No tingling numbness or neurological weakness in legs. Review of Systems Pleasant    Review of Systems   Constitutional: Negative. HENT: Negative for sore throat. Respiratory: Negative for stridor. Gastrointestinal: Negative. Genitourinary: Negative. Musculoskeletal: Positive for back pain. Negative for falls and myalgias. Muscle stiffness and chronic backache with sciatica and lumbar radiculopathy feeling better after being on pregabalin ,off from gabapentin. Neurological: Negative for dizziness, tingling, tremors, focal weakness and headaches. Psychiatric/Behavioral: Negative for depression and memory loss. The patient is not nervous/anxious and does not have insomnia.          Past Medical History:   Diagnosis Date    Bronchitis     Chronic sciatica 7/23/2020    Degenerative joint disease involving multiple joints 7/23/2020    Diet-controlled type 2 diabetes mellitus ([facility] 7/23/2020    Discharge of eye 7/23/2020    Essential hypertension 7/23/2020    Gastroesophageal reflux disease 7/23/2020    Long term current use of systemic steroids 7/23/2020    Mammogram abnormal 7/23/2020    Morbid obesity ([facility] [address] 7/23/2020    Motion sickness 7/23/2020    Neck pain 7/23/2020    Osteoarthritis of knee 7/23/2020    Osteoarthritis of multiple joints 7/23/2020    Pain in forearm 7/23/2020    Pain in joint of right shoulder 7/23/2020    Pain in right arm 7/23/2020    [facility] 7/23/2020    Screening for cholesterol level 7/23/2020    Screening for malignant neoplasm of breast 7/23/2020    Severe obesity ([facility] [address] 7/23/2020    Sleep apnea 7/23/2020    Spinal stenosis of lumbar region 7/23/2020    Vitamin D deficiency 7/23/2020    Wound of skin 7/23/2020     Past Surgical History:   Procedure Laterality Date    HX BREAST BIOPSY      HX COLONOSCOPY       Social History     Socioeconomic History    Marital status:      Spouse name: Not on file    Number of children: Not on file    Years of education: Not on file    Highest education level: Not on file   Tobacco Use    Smoking status: Never Smoker    Smokeless tobacco: Never Used   Substance and Sexual Activity    Alcohol use: Not Currently    Drug use: Never     Family History   Problem Relation Age of Onset    Heart Disease Mother     Diabetes Mother     Stroke Mother     Heart Disease Father     Stroke Father     Lupus Sister      Current Outpatient Medications   Medication Sig Dispense Refill    metFORMIN (GLUMETZA ER) 500 mg TG24 24 hour tablet Take 500 mg by mouth daily. Take with meal once a day 30 Tab 2    pregabalin (LYRICA) 150 mg capsule Take 1 Cap by mouth two (2) times a day. Max Daily Amount: 300 mg. Dose increased . 180 Cap 0    tiZANidine (ZANAFLEX) 2 mg tablet Take 1 Tab by mouth nightly.  30 Tab 2    lisinopril-hydroCHLOROthiazide (PRINZIDE, ZESTORETIC) 20-12.5 mg per tablet Take 1 Tab by mouth Every morning. 90 Tab 3    fluticasone propionate (FLONASE) 50 mcg/actuation nasal spray 2 Sprays by Both Nostrils route daily. 1 Bottle 2    cetirizine (ZYRTEC) 10 mg tablet Take 1 Tab by mouth daily. 30 Tab 2    aspirin delayed-release 81 mg tablet Take  by mouth daily.  azaTHIOprine (IMURAN) 50 mg tablet Take 50 mg by mouth daily.  folic acid/multivit-min/lutein (CENTRUM SILVER PO) Take  by mouth.  diclofenac (VOLTAREN) 1 % gel Apply  to affected area four (4) times daily.  cholecalciferol (Vitamin D3) 25 mcg (1,000 unit) cap Take  by mouth daily. Allergies   Allergen Reactions    Bactrim [Sulfamethoprim] Itching       Objective:  Visit Vitals  /60 (BP 1 Location: Right upper arm, BP Patient Position: Sitting, BP Cuff Size: Large adult)   Pulse 72   Resp 18   Ht 5' 1\" (1.549 m)   Wt 241 lb (109.3 kg)   SpO2 99%   BMI 45.54 kg/m²       Physical Exam:   Constitutional: General Appearance: Pleasant. Level of Distress: NAD. Psychiatric: Mental Status: normal mood and affect Orientation: to time, place, and person. ,normal eye contact. Head: Head: normocephalic and atraumatic. Eyes: Pupils: PERRLA. Sclerae: non-icteric. Neck: Neck: supple, trachea midline, and no masses. Lymph Nodes: no cervical LAD. Thyroid: no enlargement or nodules and non-tender. Lungs: Respiratory effort: no dyspnea. Auscultation: no wheezing, rales/crackles, or rhonchi and breath sounds normal and good air movement. Cardiovascular: Apical Impulse: not displaced. Heart Auscultation: normal S1 and S2; no murmurs, rubs, or gallops; and RRR. Neck vessels: no carotid bruits. Pulses including femoral / pedal: normal throughout. Abdomen: Bowel Sounds: normal. Inspection and Palpation: no tenderness, guarding, or masses and soft and non-distended. Liver: non-tender and no hepatomegaly.  Spleen: non-tender and no splenomegaly. Musculoskeletal[de-identified] Extremities: no edema,no varicosities. No Calf tenderness. Neurologic: Gait and Station: normal gait and station. Motor Strength normal right and left. Skin: Inspection and palpation: no rash, lesions, or ulcer. Results for orders placed or performed in visit on 02/16/21   AMB POC GLUCOSE BLOOD, BY GLUCOSE MONITORING DEVICE   Result Value Ref Range    Glucose  mg/dL       Assessment/Plan:      ICD-10-CM ICD-9-CM    1. Essential hypertension  W97 213.5 METABOLIC PANEL, BASIC   2. Spinal stenosis of lumbar region, unspecified whether neurogenic claudication present  M48.061 724.02 pregabalin (LYRICA) 150 mg capsule   3. Controlled type 2 diabetes mellitus with complication, without long-term current use of insulin ([facility])  E11.8 250.90 HEMOGLOBIN A1C WITH EAG      AMB POC GLUCOSE BLOOD, BY GLUCOSE MONITORING DEVICE   4. Polymyalgia rheumatica ([facility])  M35.3 725    5. Gastroesophageal reflux disease without esophagitis  K21.9 530.81    6. Morbid obesity ([facility]  E66.01 278.01    7. Primary osteoarthritis involving multiple joints  M89.49 715.98    8. Osteoarthritis of both knees, unspecified osteoarthritis type  M17.0 715.96    9. Abnormal mammogram  R92.8 793.80 US BREASTS COMPLETE     Orders Placed This Encounter    US BREASTS COMPLETE     Standing Status:   Future     Standing Expiration Date:   3/16/2021     Scheduling Instructions:      Bilateral ultrasound breast.    METABOLIC PANEL, BASIC    HEMOGLOBIN A1C WITH EAG    AMB POC GLUCOSE BLOOD, BY GLUCOSE MONITORING DEVICE    metFORMIN (GLUMETZA ER) 500 mg TG24 24 hour tablet     Sig: Take 500 mg by mouth daily. Take with meal once a day     Dispense:  30 Tab     Refill:  2    pregabalin (LYRICA) 150 mg capsule     Sig: Take 1 Cap by mouth two (2) times a day. Max Daily Amount: 300 mg. Dose increased . Dispense:  180 Cap     Refill:  0    tiZANidine (ZANAFLEX) 2 mg tablet     Sig: Take 1 Tab by mouth nightly. Dispense:  30 Tab     Refill:  2         Hypertension controlled. Continue lisinopril hydrochlorothiazide 20/12.5 mg once a day. Diet low in sodium. Type 2 diabetes mellitus. Having BMI 45.54. Not on steroids. She has been off from steroids since last several months by rheumatologist.  Started on Metformin extended release 500 mg once a day. She was aware that Metformin can cause side effects explained that she should take it with meals and try not to take on empty stomach to prevent GI irritation and  nausea vomiting and diarrhea and if any side effects she should let me know. She should walk as much as she can but she is musculoskeletal and rheumatological problems that she is not able to walk that much. Diabetic diet less than 1800 ADA diet. Her random blood sugar is 134 in the office. Continue low-dose aspirin. Lumbar spinal stenosis with polymyalgia rheumatica and history of sciatica and lumbar radiculopathy she did not get better taking gabapentin so I had started her on pregabalin she is feeling somewhat better so dose increased to 150 mg twice a day. She has stiffness in her joints and back spasms started on tizanidine 2 mg at bedtime to begin with and side effects discussed. Try to lose weight. Polymyalgia rheumatica getting azathioprine off from steroid, follows rheumatologist, follow his advice. Monitor CBC CMP. allergic rhinitis continue fluticasone nasal spray and cetirizine. Abnormal mammogram she was recommended to do bilateral ultrasound breast and not one-sided ultrasound so I gave orders for ultrasound bilateral breast.    Continue vitamin D and multivitamin supplementation. Morbid obesity heart healthy diet and try to be mindful eating less calorie diet and less starch and sugar in the diet.,      Follow-up in 3 months. Lab results explained. Labs ordered for next visit.     lose weight, increase physical activity, follow low fat diet, continue present plan, routine labs ordered, call if any problems    There are no Patient Instructions on file for this visit. Follow-up and Dispositions    · Return in about 3 months (around 5/16/2021) for neuropathy ,diabetes ,htn.

## 2021-02-24 ENCOUNTER — HOSPITAL ENCOUNTER (OUTPATIENT)
Dept: MAMMOGRAPHY | Age: 76
Discharge: HOME | End: 2021-02-24
Attending: INTERNAL MEDICINE
Payer: MEDICARE

## 2021-02-24 DIAGNOSIS — R92.8 ABNORMAL MAMMOGRAPHY: Primary | ICD-10-CM

## 2021-02-24 DIAGNOSIS — R92.8 ABNORMAL MAMMOGRAM: ICD-10-CM

## 2021-02-24 DIAGNOSIS — R92.8 ABNORMAL MAMMOGRAM OF LEFT BREAST: ICD-10-CM

## 2021-02-24 DIAGNOSIS — R92.8 ABNORMAL MAMMOGRAPHY: ICD-10-CM

## 2021-02-24 PROCEDURE — 77061 BREAST TOMOSYNTHESIS UNI: CPT

## 2021-02-24 PROCEDURE — 76641 ULTRASOUND BREAST COMPLETE: CPT

## 2021-02-25 DIAGNOSIS — R92.8 ABNORMAL MAMMOGRAM: Primary | ICD-10-CM

## 2021-02-25 NOTE — PROGRESS NOTES
She needs right breast stereotactic vacuum-assisted biopsy for confirmation for the calcification that she has.

## 2021-02-25 NOTE — PROGRESS NOTES
Ordered for biopsy placed in the computer and please let him know that she needs to do biopsy as recommended by radiologist who read mammogram

## 2021-03-01 ENCOUNTER — HOSPITAL ENCOUNTER (OUTPATIENT)
Dept: MAMMOGRAPHY | Age: 76
Discharge: HOME | End: 2021-03-01
Attending: INTERNAL MEDICINE
Payer: MEDICARE

## 2021-03-01 DIAGNOSIS — R92.8 ABNORMAL MAMMOGRAM: ICD-10-CM

## 2021-03-01 PROCEDURE — 77065 DX MAMMO INCL CAD UNI: CPT

## 2021-03-01 PROCEDURE — 88305 TISSUE EXAM BY PATHOLOGIST: CPT

## 2021-03-01 PROCEDURE — 77030019952 MAM STEREO VAC  BX BREAST RT 1ST LESION W/CLIP AND SPECIMEN

## 2021-03-01 RX ORDER — LIDOCAINE HYDROCHLORIDE AND EPINEPHRINE 10; 10 MG/ML; UG/ML
20 INJECTION, SOLUTION INFILTRATION; PERINEURAL ONCE
Status: ACTIVE | OUTPATIENT
Start: 2021-03-01 | End: 2021-03-01

## 2021-03-03 NOTE — PROGRESS NOTES
Please call her that she has periductal calcification otherwise nothing significant findings,No evidence of cancer repeat mammogram in 1 year.

## 2021-04-06 DIAGNOSIS — M48.061 SPINAL STENOSIS OF LUMBAR REGION, UNSPECIFIED WHETHER NEUROGENIC CLAUDICATION PRESENT: ICD-10-CM

## 2021-04-06 RX ORDER — PREGABALIN 150 MG/1
CAPSULE ORAL
Qty: 180 CAP | Refills: 0 | Status: SHIPPED | OUTPATIENT
Start: 2021-04-06 | End: 2021-07-29 | Stop reason: SDUPTHER

## 2021-07-23 LAB
BUN SERPL-MCNC: 22 MG/DL (ref 8–27)
BUN/CREAT SERPL: 22 (ref 12–28)
CALCIUM SERPL-MCNC: 9.5 MG/DL (ref 8.7–10.3)
CHLORIDE SERPL-SCNC: 101 MMOL/L (ref 96–106)
CO2 SERPL-SCNC: 24 MMOL/L (ref 20–29)
CREAT SERPL-MCNC: 1.01 MG/DL (ref 0.57–1)
EST. AVERAGE GLUCOSE BLD GHB EST-MCNC: 143 MG/DL
GLUCOSE SERPL-MCNC: 107 MG/DL (ref 65–99)
HBA1C MFR BLD: 6.6 % (ref 4.8–5.6)
POTASSIUM SERPL-SCNC: 4.4 MMOL/L (ref 3.5–5.2)
SODIUM SERPL-SCNC: 139 MMOL/L (ref 134–144)

## 2021-07-23 NOTE — PROGRESS NOTES
Please call Ms. Robert Piper that her hemoglobin A1c improved to 6.6% from 6.7% meaning 3-month average sugar is slightly better but if she takes Metformin 500 mg at least once a day to begin with it will be better. She is trying hard with healthy diet but still improved but can be better. Nothing serious. Her kidney function is stable. Potassium is good 4. 4.

## 2021-07-25 PROBLEM — E11.8 CONTROLLED TYPE 2 DIABETES MELLITUS WITH COMPLICATION, WITHOUT LONG-TERM CURRENT USE OF INSULIN (HCC): Status: RESOLVED | Noted: 2021-02-16 | Resolved: 2021-07-25

## 2021-07-27 NOTE — PROGRESS NOTES
Spoke with patient, ID verified, who stated she has been taking the Metformin for about 2 months now. She has an upcoming appt on 7/29/2021 to discuss further with provider.

## 2021-07-29 ENCOUNTER — OFFICE VISIT (OUTPATIENT)
Dept: INTERNAL MEDICINE CLINIC | Age: 76
End: 2021-07-29
Payer: MEDICARE

## 2021-07-29 VITALS
RESPIRATION RATE: 12 BRPM | HEIGHT: 61 IN | BODY MASS INDEX: 44.75 KG/M2 | SYSTOLIC BLOOD PRESSURE: 130 MMHG | HEART RATE: 72 BPM | WEIGHT: 237 LBS | DIASTOLIC BLOOD PRESSURE: 68 MMHG | OXYGEN SATURATION: 99 %

## 2021-07-29 DIAGNOSIS — M15.9 PRIMARY OSTEOARTHRITIS INVOLVING MULTIPLE JOINTS: ICD-10-CM

## 2021-07-29 DIAGNOSIS — M48.061 SPINAL STENOSIS OF LUMBAR REGION, UNSPECIFIED WHETHER NEUROGENIC CLAUDICATION PRESENT: ICD-10-CM

## 2021-07-29 DIAGNOSIS — E66.01 SEVERE OBESITY (HCC): ICD-10-CM

## 2021-07-29 DIAGNOSIS — M35.3 POLYMYALGIA RHEUMATICA (HCC): ICD-10-CM

## 2021-07-29 DIAGNOSIS — G47.30 SLEEP APNEA, UNSPECIFIED TYPE: ICD-10-CM

## 2021-07-29 DIAGNOSIS — E11.9 DIET-CONTROLLED TYPE 2 DIABETES MELLITUS (HCC): ICD-10-CM

## 2021-07-29 DIAGNOSIS — J30.9 ALLERGIC RHINITIS, UNSPECIFIED SEASONALITY, UNSPECIFIED TRIGGER: ICD-10-CM

## 2021-07-29 DIAGNOSIS — I10 ESSENTIAL HYPERTENSION: ICD-10-CM

## 2021-07-29 LAB — GLUCOSE POC: 124 MG/DL

## 2021-07-29 PROCEDURE — G8752 SYS BP LESS 140: HCPCS | Performed by: INTERNAL MEDICINE

## 2021-07-29 PROCEDURE — G8417 CALC BMI ABV UP PARAM F/U: HCPCS | Performed by: INTERNAL MEDICINE

## 2021-07-29 PROCEDURE — G8510 SCR DEP NEG, NO PLAN REQD: HCPCS | Performed by: INTERNAL MEDICINE

## 2021-07-29 PROCEDURE — G8754 DIAS BP LESS 90: HCPCS | Performed by: INTERNAL MEDICINE

## 2021-07-29 PROCEDURE — 1101F PT FALLS ASSESS-DOCD LE1/YR: CPT | Performed by: INTERNAL MEDICINE

## 2021-07-29 PROCEDURE — 1090F PRES/ABSN URINE INCON ASSESS: CPT | Performed by: INTERNAL MEDICINE

## 2021-07-29 PROCEDURE — G8400 PT W/DXA NO RESULTS DOC: HCPCS | Performed by: INTERNAL MEDICINE

## 2021-07-29 PROCEDURE — G8536 NO DOC ELDER MAL SCRN: HCPCS | Performed by: INTERNAL MEDICINE

## 2021-07-29 PROCEDURE — 99214 OFFICE O/P EST MOD 30 MIN: CPT | Performed by: INTERNAL MEDICINE

## 2021-07-29 PROCEDURE — 82962 GLUCOSE BLOOD TEST: CPT | Performed by: INTERNAL MEDICINE

## 2021-07-29 PROCEDURE — G8427 DOCREV CUR MEDS BY ELIG CLIN: HCPCS | Performed by: INTERNAL MEDICINE

## 2021-07-29 RX ORDER — ISOPROPYL ALCOHOL 70 ML/100ML
1 SWAB TOPICAL ONCE
Qty: 100 PAD | Refills: 3 | Status: SHIPPED | OUTPATIENT
Start: 2021-07-29 | End: 2021-07-29

## 2021-07-29 RX ORDER — INSULIN PUMP SYRINGE, 3 ML
EACH MISCELLANEOUS
Qty: 1 KIT | Refills: 1 | Status: SHIPPED | OUTPATIENT
Start: 2021-07-29 | End: 2022-02-02 | Stop reason: SDDI

## 2021-07-29 RX ORDER — LANCETS
EACH MISCELLANEOUS
Qty: 100 EACH | Refills: 3 | Status: SHIPPED | OUTPATIENT
Start: 2021-07-29 | End: 2022-02-02

## 2021-07-29 RX ORDER — PREGABALIN 150 MG/1
150 CAPSULE ORAL 2 TIMES DAILY
Qty: 180 CAPSULE | Refills: 1 | Status: SHIPPED | OUTPATIENT
Start: 2021-07-29 | End: 2022-01-03

## 2021-07-29 RX ORDER — IBUPROFEN 200 MG
CAPSULE ORAL
Qty: 100 STRIP | Refills: 3 | Status: SHIPPED | OUTPATIENT
Start: 2021-07-29 | End: 2022-02-02

## 2021-07-29 NOTE — PROGRESS NOTES
Cindy Cornejo is a 68 y.o. female and presents with Follow Up Chronic Condition, Hypertension, and Diabetes (124 in office )    Ms. Jossie Vasquez came for regular follow-up. She is getting Lyrica that I have started in helping her in her back pain. She is known to have lumbar spinal stenosis with sciatica and she was diagnosed with polymyalgia rheumatica she had does not have lupus her sister has lupus. She follows rheumatologist.  Recently she had, left wrist carpal tunnel surgery and she was hold on azathioprine for now. She takes 50 mg/day. She has no depression. Her blood pressure is controlled with current medication. Her hemoglobin A1c is 6.6%. She has started taking Metformin. She needs diabetic supply. Her random blood sugar is 124 in the office. She has taken Covid vaccine. She is going to have carpal tunnel release surgery in her right wrist also. She was having difficulty in holding objects . She takes Metformin once a day. Her creatinine is 1.01 with GFR 54 and BUN 22. She has normal potassium. Her biopsy of the breast results came benign. It was showing periductal calcification with associated sclerosis and no evidence of malignancy. Review of Systems    Review of Systems   Constitutional: Negative. HENT: Negative for congestion and sore throat. Eyes: Negative for blurred vision. Respiratory: Negative for cough and wheezing. Cardiovascular: Negative. Gastrointestinal: Negative. Genitourinary: Negative. Musculoskeletal: Negative for falls and myalgias. H/o djd.lumbar spinal stenosis and had lt carpal tunnel sx on 14 th July. Neurological: Negative for dizziness, tingling and sensory change. Psychiatric/Behavioral: Negative for depression, hallucinations, memory loss and substance abuse. The patient does not have insomnia.          Past Medical History:   Diagnosis Date    Bronchitis     Chronic sciatica 7/23/2020    Degenerative joint disease involving multiple joints 7/23/2020    Diet-controlled type 2 diabetes mellitus ([facility] [address] 7/23/2020    Discharge of eye 7/23/2020    Essential hypertension 7/23/2020    Gastroesophageal reflux disease 7/23/2020    Long term current use of systemic steroids 7/23/2020    Mammogram abnormal 7/23/2020    Morbid obesity ([facility] 7/23/2020    Motion sickness 7/23/2020    Neck pain 7/23/2020    Osteoarthritis of knee 7/23/2020    Osteoarthritis of multiple joints 7/23/2020    Pain in forearm 7/23/2020    Pain in joint of right shoulder 7/23/2020    Pain in right arm 7/23/2020    [facility] 7/23/2020    Screening for cholesterol level 7/23/2020    Screening for malignant neoplasm of breast 7/23/2020    Severe obesity ([facility] 7/23/2020    Sleep apnea 7/23/2020    Spinal stenosis of lumbar region 7/23/2020    Vitamin D deficiency 7/23/2020    Wound of skin 7/23/2020     Past Surgical History:   Procedure Laterality Date    HX BREAST BIOPSY Right     benign    HX COLONOSCOPY       Social History     Socioeconomic History    Marital status:      Spouse name: Not on file    Number of children: Not on file    Years of education: Not on file    Highest education level: Not on file   Tobacco Use    Smoking status: Never Smoker    Smokeless tobacco: Never Used   Substance and Sexual Activity    Alcohol use: Not Currently    Drug use: Never     Social Determinants of Health     Financial Resource Strain:     Difficulty of Paying Living Expenses:    Food Insecurity:     Worried About Running Out of Food in the Last Year:     Ran Out of Food in the Last Year:    Transportation Needs:     Lack of Transportation (Medical):      Lack of Transportation (Non-Medical):    Physical Activity:     Days of Exercise per Week:     Minutes of Exercise per Session:    Stress:     Feeling of Stress :    Social Connections:     Frequency of Communication with Friends and Family:     Frequency of Social Gatherings with Friends and Family:     Attends Yazdanism Services:     Active Member of Clubs or Organizations:     Attends Club or Organization Meetings:     Marital Status:      Family History   Problem Relation Age of Onset    Heart Disease Mother     Diabetes Mother     Stroke Mother     Heart Disease Father     Stroke Father     Lupus Sister      Current Outpatient Medications   Medication Sig Dispense Refill    pregabalin (LYRICA) 150 mg capsule Take 1 Capsule by mouth two (2) times a day. Max Daily Amount: 300 mg. 180 Capsule 1    alcohol swabs padm 1 Box by Apply Externally route once for 1 dose. 100 Pad 3    Blood-Glucose Meter monitoring kit To check blood sugar once a day before breakfast. 1 Kit 1    glucose blood VI test strips (blood glucose test) strip To check blood sugar once a day before breakfast. 100 Strip 3    lancets misc To check blood sugar once a day before breakfast. 100 Each 3    metFORMIN ER (GLUCOPHAGE XR) 500 mg tablet TAKE 1 TABLET BY MOUTH ONCE DAILY WITH MEALS 90 Tablet 2    cetirizine (ZYRTEC) 10 mg tablet Take 1 tablet by mouth once daily 90 Tab 1    lisinopril-hydroCHLOROthiazide (PRINZIDE, ZESTORETIC) 20-12.5 mg per tablet Take 1 Tab by mouth Every morning. 90 Tab 3    fluticasone propionate (FLONASE) 50 mcg/actuation nasal spray 2 Sprays by Both Nostrils route daily. 1 Bottle 2    aspirin delayed-release 81 mg tablet Take  by mouth daily.  folic acid/multivit-min/lutein (CENTRUM SILVER PO) Take  by mouth.  diclofenac (VOLTAREN) 1 % gel Apply  to affected area four (4) times daily.  cholecalciferol (Vitamin D3) 25 mcg (1,000 unit) cap Take  by mouth daily.  azaTHIOprine (IMURAN) 50 mg tablet Take 50 mg by mouth daily.  (Patient not taking: Reported on 7/29/2021)       Allergies   Allergen Reactions    Bactrim [Sulfamethoprim] Itching       Objective:  Visit Vitals  /68 (BP 1 Location: Right upper arm, BP Patient Position: Sitting, BP Cuff Size: Large adult)   Pulse 72   Resp 12   Ht 5' 1\" (1.549 m)   Wt 237 lb (107.5 kg)   SpO2 99%   BMI 44.78 kg/m²       Physical Exam:   Constitutional: General Appearance: Sandy Linger Very pleasant level of Distress: NAD. Psychiatric: Mental Status: normal mood and affect Orientation: to time, place, and person. ,normal eye contact. Head: Head: normocephalic and atraumatic. Eyes: Pupils: PERRLA. Sclerae: non-icteric. Neck: Neck: supple, trachea midline, and no masses. Lymph Nodes: no cervical LAD. Thyroid: no enlargement or nodules and non-tender. Lungs: Respiratory effort: no dyspnea. Auscultation: no wheezing, rales/crackles, or rhonchi and breath sounds normal and good air movement. Cardiovascular: Apical Impulse: not displaced. Heart Auscultation: normal S1 and S2; no murmurs, rubs, or gallops; and RRR. Neck vessels: no carotid bruits. Pulses including femoral / pedal: normal throughout. Abdomen: Bowel Sounds: normal. Inspection and Palpation: no tenderness, guarding, or masses and soft and non-distended. Liver: non-tender and no hepatomegaly. Spleen: non-tender and no splenomegaly. Musculoskeletal[de-identified] Extremities: no edema,no varicosities. No Calf tenderness. Neurologic: Gait and Station: normal gait and station. Motor Strength normal right and left. Skin: Inspection and palpation: no rash, lesions, or ulcer. Results for orders placed or performed in visit on 07/29/21   AMB POC GLUCOSE BLOOD, BY GLUCOSE MONITORING DEVICE   Result Value Ref Range    Glucose  MG/DL       Assessment/Plan:      ICD-10-CM ICD-9-CM    1. Essential hypertension  I10 401.9    2. Diet-controlled type 2 diabetes mellitus (HCC)  E11.9 250.00 AMB POC GLUCOSE BLOOD, BY GLUCOSE MONITORING DEVICE   3. Spinal stenosis of lumbar region, unspecified whether neurogenic claudication present  M48.061 724.02 pregabalin (LYRICA) 150 mg capsule   4.  Allergic rhinitis, unspecified seasonality, unspecified trigger  J30.9 477.9 5. Polymyalgia rheumatica ([facility])  M35.3 725    6. Primary osteoarthritis involving multiple joints  M89.49 715.98    7. Sleep apnea, unspecified type  G47.30 780.57    8. Severe obesity ([facility] [address]  E66.01 278.01      Orders Placed This Encounter    AMB POC GLUCOSE BLOOD, BY GLUCOSE MONITORING DEVICE    pregabalin (LYRICA) 150 mg capsule     Sig: Take 1 Capsule by mouth two (2) times a day. Max Daily Amount: 300 mg. Dispense:  180 Capsule     Refill:  1    alcohol swabs padm     Si Box by Apply Externally route once for 1 dose. Dispense:  100 Pad     Refill:  3    Blood-Glucose Meter monitoring kit     Sig: To check blood sugar once a day before breakfast.     Dispense:  1 Kit     Refill:  1    glucose blood VI test strips (blood glucose test) strip     Sig: To check blood sugar once a day before breakfast.     Dispense:  100 Strip     Refill:  3    lancets misc     Sig: To check blood sugar once a day before breakfast.     Dispense:  100 Each     Refill:  3       Hypertension controlled continue lisinopril hydrochlorothiazide 20/12.5 mg once a day. Diet low in sodium. Type 2 diabetes mellitus controlled with hemoglobin A1c 6.6%. Currently she does not take any prednisone. Diabetic diet. Diabetic supply given. Diet low in starch and sugar and she should take sugar substitutes. Continue low-dose aspirin. She does not take statin. Walking minimum 8044-0783 steps per day. Regular ophthalmologic checkup. Her random blood sugar is 124 in the office. Lumbar spinal stenosis with sciatica and history of polymyalgia rheumatica. I had started her on Lyrica 150 mg twice a day. She is not taking tizanidine. She was tried different medicine which was not helping including gabapentin. She was taking NSAIDs currently not taking.   She was given Imuran for her polymyalgia rheumatica but it is on hold because she underwent surgery for left carpal tunnel and going to have another surgery in right wrist for carpal tunnel. She had abnormal mammogram underwent ultrasound and stereotactic biopsy which is not showing any malignancy. Patient is aware. Allergic rhinitis taking cetirizine and fluticasone nasal spray. Morbid obesity with BMI 44.78 healthy eating habits and calorie rich disciplined diet and portion control and walking if possible minimum 4000 5000 steps per day. Up to date for    COVID-19 vaccine. Labs ordered. Refills given. Follow-up in 3 months for Medicare wellness and regular follow-up.    lose weight, increase physical activity, follow low fat diet, continue present plan, call if any problems    There are no Patient Instructions on file for this visit. Follow-up and Dispositions    · Return in about 3 months (around 10/29/2021) for medicare wellness and f/u htn ,diabetes .

## 2021-07-29 NOTE — PROGRESS NOTES
Chief Complaint   Patient presents with    Follow Up Chronic Condition    Hypertension    Diabetes     124 in office        1. Have you been to the ER, urgent care clinic since your last visit? Hospitalized since your last visit? No    2. Have you seen or consulted any other health care providers outside of the [address] [place] [address] since your last visit? Include any pap smears or colon screening.  No     Visit Vitals  BP (!) 117/56 (BP 1 Location: Left upper arm, BP Patient Position: Sitting, BP Cuff Size: Adult)   Pulse 77   Resp 12   Ht 5' 1\" (1.549 m)   Wt 237 lb (107.5 kg)   SpO2 99%   BMI 44.78 kg/m²

## 2021-08-03 PROBLEM — E66.01 MORBID OBESITY (HCC): Status: RESOLVED | Noted: 2020-07-23 | Resolved: 2021-08-03

## 2021-09-23 ENCOUNTER — HOSPITAL ENCOUNTER (EMERGENCY)
Age: 76
Discharge: HOME | End: 2021-09-23
Attending: STUDENT IN AN ORGANIZED HEALTH CARE EDUCATION/TRAINING PROGRAM
Payer: MEDICARE

## 2021-09-23 VITALS
OXYGEN SATURATION: 100 % | TEMPERATURE: 98.4 F | HEIGHT: 61 IN | RESPIRATION RATE: 19 BRPM | WEIGHT: 235 LBS | SYSTOLIC BLOOD PRESSURE: 144 MMHG | DIASTOLIC BLOOD PRESSURE: 68 MMHG | BODY MASS INDEX: 44.37 KG/M2 | HEART RATE: 97 BPM

## 2021-09-23 DIAGNOSIS — K12.0 APHTHOUS ULCER: Primary | ICD-10-CM

## 2021-09-23 PROCEDURE — 99282 EMERGENCY DEPT VISIT SF MDM: CPT

## 2021-09-23 NOTE — ED TRIAGE NOTES
Pt has pain on gums on top left and right side that has been going on for a week and pt wants to know what the problem could be. Pt has a partial plate at the top of her mouth. Pt states it looked like she has a blister on the top left gum and a spot on her tonsil area, pt throat also feels a little irritated.

## 2021-09-23 NOTE — ED PROVIDER NOTES
EMERGENCY DEPARTMENT HISTORY AND PHYSICAL EXAM      Date: 9/23/2021  Patient Name: Foster Noe    History of Presenting Illness     Chief Complaint   Patient presents with    Gum Problem    Sore Throat       History Provided By: Patient    HPI: Foster Noe, 68 y.o. female with a past medical history significant Bronchitis, GERD, dental plate obesity presents to the ED with cc of pain to left side of gum, sore throat x 3 days. Noticed some redness to the inside of her mouth. Patient denies any fevers chills denies any trouble swallowing, throat closing, trouble breathing. Patient states she has noticed slight ulcerations to roof of mouth. There are no other complaints, changes, or physical findings at this time. PCP: Madhu Abdalla MD    No current facility-administered medications on file prior to encounter. Current Outpatient Medications on File Prior to Encounter   Medication Sig Dispense Refill    pregabalin (LYRICA) 150 mg capsule Take 1 Capsule by mouth two (2) times a day. Max Daily Amount: 300 mg. 180 Capsule 1    Blood-Glucose Meter monitoring kit To check blood sugar once a day before breakfast. 1 Kit 1    glucose blood VI test strips (blood glucose test) strip To check blood sugar once a day before breakfast. 100 Strip 3    lancets misc To check blood sugar once a day before breakfast. 100 Each 3    metFORMIN ER (GLUCOPHAGE XR) 500 mg tablet TAKE 1 TABLET BY MOUTH ONCE DAILY WITH MEALS 90 Tablet 2    cetirizine (ZYRTEC) 10 mg tablet Take 1 tablet by mouth once daily 90 Tab 1    lisinopril-hydroCHLOROthiazide (PRINZIDE, ZESTORETIC) 20-12.5 mg per tablet Take 1 Tab by mouth Every morning. 90 Tab 3    fluticasone propionate (FLONASE) 50 mcg/actuation nasal spray 2 Sprays by Both Nostrils route daily. 1 Bottle 2    aspirin delayed-release 81 mg tablet Take  by mouth daily.  azaTHIOprine (IMURAN) 50 mg tablet Take 50 mg by mouth daily.  (Patient not taking: Reported on 4/12/6678)      folic acid/multivit-min/lutein (CENTRUM SILVER PO) Take  by mouth.  diclofenac (VOLTAREN) 1 % gel Apply  to affected area four (4) times daily.  cholecalciferol (Vitamin D3) 25 mcg (1,000 unit) cap Take  by mouth daily.          Past History     Past Medical History:  Past Medical History:   Diagnosis Date    Bronchitis     Chronic sciatica 7/23/2020    Degenerative joint disease involving multiple joints 7/23/2020    Diet-controlled type 2 diabetes mellitus ([address] 7/23/2020    Discharge of eye 7/23/2020    Essential hypertension 7/23/2020    Gastroesophageal reflux disease 7/23/2020    Long term current use of systemic steroids 7/23/2020    Mammogram abnormal 7/23/2020    Morbid obesity ([address] 7/23/2020    Motion sickness 7/23/2020    Neck pain 7/23/2020    Osteoarthritis of knee 7/23/2020    Osteoarthritis of multiple joints 7/23/2020    Pain in forearm 7/23/2020    Pain in joint of right shoulder 7/23/2020    Pain in right arm 7/23/2020    Polymyalgia rheumatica ([address] 7/23/2020    Screening for cholesterol level 7/23/2020    Screening for malignant neoplasm of breast 7/23/2020    Severe obesity ([address] 7/23/2020    Sleep apnea 7/23/2020    Spinal stenosis of lumbar region 7/23/2020    Vitamin D deficiency 7/23/2020    Wound of skin 7/23/2020       Past Surgical History:  Past Surgical History:   Procedure Laterality Date    HX BREAST BIOPSY Right     benign    HX COLONOSCOPY      HX ORTHOPAEDIC Right 09/15/2021    carpal tunnel surgery of R hand    HX ORTHOPAEDIC Left 07/15/2021    carpal tunnel surgery of L hand       Family History:  Family History   Problem Relation Age of Onset    Heart Disease Mother     Diabetes Mother     Stroke Mother     Heart Disease Father     Stroke Father     Lupus Sister        Social History:  Social History     Tobacco Use    Smoking status: Never Smoker    Smokeless tobacco: Never Used   Substance Use Topics    Alcohol use: Not Currently    Drug use: Never       Allergies: Allergies   Allergen Reactions    Bactrim [Sulfamethoprim] Itching         Review of Systems     Review of Systems   Constitutional: Negative for activity change, appetite change, chills and fever. HENT: Positive for mouth sores and sore throat. Negative for drooling, ear pain, rhinorrhea, trouble swallowing and voice change. Eyes: Negative for photophobia and visual disturbance. Respiratory: Negative for cough and shortness of breath. Cardiovascular: Negative for chest pain and palpitations. Gastrointestinal: Negative for abdominal pain and nausea. Genitourinary: Negative for dysuria and hematuria. Musculoskeletal: Negative for arthralgias and myalgias. Skin: Negative for color change and pallor. Neurological: Negative for dizziness, weakness, numbness and headaches. Physical Exam     Physical Exam  Vitals and nursing note reviewed. Constitutional:       General: She is not in acute distress. Appearance: Normal appearance. She is obese. She is not ill-appearing. HENT:      Head: Normocephalic and atraumatic. Nose: Nose normal.      Mouth/Throat:      Mouth: Mucous membranes are moist. Oral lesions present. No injury. Dentition: Normal dentition. No dental tenderness or dental caries. Pharynx: No pharyngeal swelling, oropharyngeal exudate or posterior oropharyngeal erythema. Tonsils: No tonsillar exudate or tonsillar abscesses. Eyes:      Extraocular Movements: Extraocular movements intact. Pupils: Pupils are equal, round, and reactive to light. Cardiovascular:      Rate and Rhythm: Normal rate and regular rhythm. Pulses: Normal pulses. Pulmonary:      Effort: Pulmonary effort is normal.   Abdominal:      General: Abdomen is flat. Bowel sounds are normal.      Palpations: Abdomen is soft. Tenderness: There is no abdominal tenderness. There is no guarding.    Musculoskeletal: General: No tenderness. Normal range of motion. Cervical back: Normal range of motion and neck supple. No muscular tenderness. Skin:     General: Skin is warm and dry. Neurological:      General: No focal deficit present. Mental Status: She is alert and oriented to person, place, and time. Sensory: No sensory deficit. Motor: No weakness. Diagnostic Study Results     Labs -   No results found for this or any previous visit (from the past 12 hour(s)). Radiologic Studies -   @lastxrresult@  CT Results  (Last 48 hours)    None        CXR Results  (Last 48 hours)    None            Medical Decision Making   I am the first provider for this patient. I reviewed the vital signs, available nursing notes, past medical history, past surgical history, family history and social history. Vital Signs-Reviewed the patient's vital signs. Patient Vitals for the past 12 hrs:   Temp Pulse Resp BP SpO2   09/23/21 1615 98.4 °F (36.9 °C) 97 19 (!) 144/68 100 %       Records Reviewed: Nursing Notes    The patient presents with ulcerations to mouth with a differential diagnosis of pink or sore, aphthous ulcer, herpes, intraoral infection      Provider Notes (Medical Decision Making):     MDM     80-year-old female, no significant past medical history presents to emergency department for 3 days of mouth and throat pain. Patient noticed mild redness and ulcerations to top of mouth 3 days ago. Exam shows well-appearing female, no distress, afebrile, intra oral examination shows small aphthous ulcers to left lateral aspect of hard palate, no pharyngeal swelling or erythema, no cervical lymphadenopathy. At this time do not suspect any significant intraoral infection, most likely patient suffering from aphthous ulcer, patient states that she did brush her teeth very hard and may have scraped the top of her mouth.   Will discharge patient home with prescription for Magic mouthwash, instructed to follow-up with primary care physician in the next week if any worsening pain to mouth. ED Course:   Initial assessment performed. The patients presenting problems have been discussed, and they are in agreement with the care plan formulated and outlined with them. I have encouraged them to ask questions as they arise throughout their visit. PROCEDURES  Procedures         PLAN:  1. Current Discharge Medication List      START taking these medications    Details   aluminum-magnesium hydroxide 200-200 mg/5 mL susp 5 mL, diphenhydrAMINE 12.5 mg/5 mL liqd 12.5 mg, lidocaine 2 % soln 5 mL 5 mL by Swish and Spit route two (2) times a day. Magic mouth wash   Maalox  Lidocaine 2% viscous   Diphenhydramine oral solution     Pharmacy to mix equal portions of ingredients to a total volume as indicated in the dispense amount. Qty: 50 mL, Refills: 0  Start date: 9/23/2021           2. Follow-up Information     Follow up With Specialties Details Why Contact Info    Leena Boyer MD Internal Medicine In 1 week  [facility],[address] Floor, [facility]  [phone]          Return to ED if worse     Diagnosis     Clinical Impression:   1.  Aphthous ulcer

## 2021-10-04 ENCOUNTER — TELEPHONE (OUTPATIENT)
Dept: INTERNAL MEDICINE CLINIC | Age: 76
End: 2021-10-04

## 2021-10-04 NOTE — TELEPHONE ENCOUNTER
Document Audit/Review Team sent request for medical records for supply of diabetic supplies. Office note and labs sent to [phone].

## 2021-10-06 RX ORDER — LANCETS
EACH MISCELLANEOUS
Qty: 100 EACH | Refills: 1 | Status: SHIPPED | OUTPATIENT
Start: 2021-10-06 | End: 2022-02-02

## 2021-10-29 ENCOUNTER — OFFICE VISIT (OUTPATIENT)
Dept: INTERNAL MEDICINE CLINIC | Age: 76
End: 2021-10-29
Payer: MEDICARE

## 2021-10-29 VITALS
BODY MASS INDEX: 44.63 KG/M2 | DIASTOLIC BLOOD PRESSURE: 80 MMHG | HEART RATE: 72 BPM | WEIGHT: 236.4 LBS | SYSTOLIC BLOOD PRESSURE: 130 MMHG | RESPIRATION RATE: 14 BRPM | HEIGHT: 61 IN | OXYGEN SATURATION: 95 %

## 2021-10-29 DIAGNOSIS — E11.9 CONTROLLED TYPE 2 DIABETES MELLITUS WITHOUT COMPLICATION, WITHOUT LONG-TERM CURRENT USE OF INSULIN (HCC): ICD-10-CM

## 2021-10-29 DIAGNOSIS — J30.9 ALLERGIC RHINITIS, UNSPECIFIED SEASONALITY, UNSPECIFIED TRIGGER: ICD-10-CM

## 2021-10-29 DIAGNOSIS — M35.3 POLYMYALGIA RHEUMATICA (HCC): ICD-10-CM

## 2021-10-29 DIAGNOSIS — M17.0 OSTEOARTHRITIS OF BOTH KNEES, UNSPECIFIED OSTEOARTHRITIS TYPE: ICD-10-CM

## 2021-10-29 DIAGNOSIS — M15.9 PRIMARY OSTEOARTHRITIS INVOLVING MULTIPLE JOINTS: ICD-10-CM

## 2021-10-29 DIAGNOSIS — Z00.00 MEDICARE ANNUAL WELLNESS VISIT, SUBSEQUENT: ICD-10-CM

## 2021-10-29 DIAGNOSIS — M48.061 SPINAL STENOSIS OF LUMBAR REGION, UNSPECIFIED WHETHER NEUROGENIC CLAUDICATION PRESENT: ICD-10-CM

## 2021-10-29 DIAGNOSIS — E78.00 PURE HYPERCHOLESTEROLEMIA: ICD-10-CM

## 2021-10-29 DIAGNOSIS — I10 PRIMARY HYPERTENSION: ICD-10-CM

## 2021-10-29 DIAGNOSIS — G47.30 SLEEP APNEA, UNSPECIFIED TYPE: ICD-10-CM

## 2021-10-29 DIAGNOSIS — E11.9 DIET-CONTROLLED TYPE 2 DIABETES MELLITUS (HCC): ICD-10-CM

## 2021-10-29 LAB — GLUCOSE POC: 159 MG/DL

## 2021-10-29 PROCEDURE — G8510 SCR DEP NEG, NO PLAN REQD: HCPCS | Performed by: INTERNAL MEDICINE

## 2021-10-29 PROCEDURE — G8754 DIAS BP LESS 90: HCPCS | Performed by: INTERNAL MEDICINE

## 2021-10-29 PROCEDURE — 1101F PT FALLS ASSESS-DOCD LE1/YR: CPT | Performed by: INTERNAL MEDICINE

## 2021-10-29 PROCEDURE — 82962 GLUCOSE BLOOD TEST: CPT | Performed by: INTERNAL MEDICINE

## 2021-10-29 PROCEDURE — G8417 CALC BMI ABV UP PARAM F/U: HCPCS | Performed by: INTERNAL MEDICINE

## 2021-10-29 PROCEDURE — G8400 PT W/DXA NO RESULTS DOC: HCPCS | Performed by: INTERNAL MEDICINE

## 2021-10-29 PROCEDURE — G8752 SYS BP LESS 140: HCPCS | Performed by: INTERNAL MEDICINE

## 2021-10-29 PROCEDURE — 99213 OFFICE O/P EST LOW 20 MIN: CPT | Performed by: INTERNAL MEDICINE

## 2021-10-29 PROCEDURE — G8427 DOCREV CUR MEDS BY ELIG CLIN: HCPCS | Performed by: INTERNAL MEDICINE

## 2021-10-29 PROCEDURE — G8536 NO DOC ELDER MAL SCRN: HCPCS | Performed by: INTERNAL MEDICINE

## 2021-10-29 PROCEDURE — 1090F PRES/ABSN URINE INCON ASSESS: CPT | Performed by: INTERNAL MEDICINE

## 2021-10-29 PROCEDURE — G0439 PPPS, SUBSEQ VISIT: HCPCS | Performed by: INTERNAL MEDICINE

## 2021-10-29 NOTE — PROGRESS NOTES
Chief Complaint   Patient presents with    Annual Wellness Visit    Follow Up Chronic Condition    Hypertension    Diabetes     159 in office      Visit Vitals  /80 (BP 1 Location: Right upper arm, BP Patient Position: Sitting, BP Cuff Size: Large adult)   Pulse 72   Resp 14   Ht 5' 1\" (1.549 m)   Wt 236 lb 6.4 oz (107.2 kg)   SpO2 95%   BMI 44.67 kg/m²     1. Have you been to the ER, urgent care clinic since your last visit? Hospitalized since your last visit? [facility] Ulcer in Mouth 08/2021    2. Have you seen or consulted any other health care providers outside of the [address] [place] [address] since your last visit? Include any pap smears or colon screening. Rheumatologist  10/25/2021    This is the Subsequent Medicare Annual Wellness Exam, performed 12 months or more after the Initial AWV or the last Subsequent AWV    I have reviewed the patient's medical history in detail and updated the computerized patient record. Ms. Karen Hickey is here for subsequent Medicare wellness visit and for her regular follow-up. Her blood pressure is controlled after resting. She is compliant for her lisinopril hydrochlorothiazide 20/12.5 mg/day. Her hemoglobin A1c is 6.5 after being on Metformin  mg once a day. It was 6.7 in the past.  She takes pregabalin for her history of lumbar spinal stenosis and sciatica and she has polymyalgia rheumatica and now she is taking azathioprine by her rheumatologist.  She has recently done surgery in her both wrist for carpal tunnel. She walks unassisted. Her BMI is 44.67. She has no depression. Up to date for Covid vaccine. Prevnar 13 given in the office. Recommended to get Shingrix vaccine from pharmacy and tetanus vaccine every 10-year. She has taken Pneumovax 23 in 2019. She has taken flu vaccine last year and she wants to take high-dose flu vaccine for 7 years from the pharmacy. She is never smoker. She does not drink alcohol. She is Sikh. She follows eye doctor. She has done her labs in July. Review of Systems   Constitutional: Negative. HENT: Negative for congestion and sore throat. Eyes: Negative for blurred vision. Respiratory: Negative for cough and wheezing. Cardiovascular: Negative. Gastrointestinal: Negative. Genitourinary: Negative for flank pain, hematuria and urgency. Musculoskeletal:        History of spinal stenosis with sciatica and radiculopathy recently underwent surgery for carpal tunnel in both wrists at [facility]   Neurological: Negative for dizziness, tingling, tremors and speech change. Endo/Heme/Allergies: Negative for polydipsia. Psychiatric/Behavioral: Negative for depression, hallucinations, memory loss and substance abuse. The patient is not nervous/anxious and does not have insomnia. Physical Exam  Constitutional:       Appearance: Normal appearance. She is obese. Cardiovascular:      Rate and Rhythm: Normal rate and regular rhythm. Pulses: Normal pulses. Heart sounds: Normal heart sounds. Pulmonary:      Effort: Pulmonary effort is normal.      Breath sounds: Normal breath sounds. Abdominal:      General: Bowel sounds are normal.      Palpations: Abdomen is soft. Musculoskeletal:      Cervical back: Normal range of motion. Skin:     General: Skin is warm. Neurological:      General: No focal deficit present. Mental Status: She is alert. Psychiatric:         Mood and Affect: Mood normal.         Behavior: Behavior normal.         Thought Content:  Thought content normal.         Judgment: Judgment normal.         Assessment/Plan   Education and counseling provided:  Are appropriate based on today's review and evaluation  End-of-Life planning (with patient's consent)  Pneumococcal Vaccine  Influenza Vaccine  Hepatitis B Vaccine  Screening Mammography  Screening Pap and pelvic (covered once every 2 years)  Colorectal cancer screening tests  Cardiovascular screening blood test  Bone mass measurement (DEXA)  Screening for glaucoma  Diabetes screening test  Medical nutrition therapy for individuals with diabetes or renal disease  Diabetes outpatient self-management training services    1. Medicare annual wellness visit, subsequent  2. Primary hypertension  -     CBC WITH AUTOMATED DIFF  -     METABOLIC PANEL, COMPREHENSIVE  3. Controlled type 2 diabetes mellitus without complication, without long-term current use of insulin (HCC)  -     HEMOGLOBIN A1C WITH EAG  4. Spinal stenosis of lumbar region, unspecified whether neurogenic claudication present  5. Polymyalgia rheumatica ([facility]  6. Allergic rhinitis, unspecified seasonality, unspecified trigger  7. Primary osteoarthritis involving multiple joints  8. Sleep apnea, unspecified type  9. Pure hypercholesterolemia  -     LIPID PANEL  10. Osteoarthritis of both knees, unspecified osteoarthritis type  11. Diet-controlled type 2 diabetes mellitus (HCC)  -     AMB POC GLUCOSE BLOOD, BY GLUCOSE MONITORING DEVICE       Depression Risk Factor Screening     3 most recent PHQ Screens 10/29/2021   Little interest or pleasure in doing things Not at all   Feeling down, depressed, irritable, or hopeless Not at all   Total Score PHQ 2 0       Alcohol Risk Screen    Do you average more than 1 drink per night or more than 7 drinks a week:  No    On any one occasion in the past three months have you have had more than 3 drinks containing alcohol:  No        Functional Ability and Level of Safety    Hearing: Hearing is good. Activities of Daily Living: The home contains: handrails, grab bars and rugs  Patient does total self care      Ambulation: with no difficulty     Fall Risk:  Fall Risk Assessment, last 12 mths 10/29/2021   Able to walk? Yes   Fall in past 12 months? 0   Do you feel unsteady?  0   Are you worried about falling 0      Abuse Screen:  Patient is not abused       Cognitive Screening    Has your family/caregiver stated any concerns about your memory: no     Cognitive Screening: Normal - Clock Drawing Test, Mini Cog Test, Verbal Fluency Test    Health Maintenance Due     Health Maintenance Due   Topic Date Due    Hepatitis C Screening  Never done    Foot Exam Q1  Never done    MICROALBUMIN Q1  Never done    DTaP/Tdap/Td series (1 - Tdap) Never done    Shingrix Vaccine Age 50> (1 of 2) Never done    Bone Densitometry (Dexa) Screening  Never done    COVID-19 Vaccine (3 - Moderna risk 3-dose series) 04/17/2021    Flu Vaccine (1) 09/01/2021       Patient Care Team   Patient Care Team:  Addie Pham MD as PCP - General (Internal Medicine)  Addie Pham MD as PCP - [facility] Empaneled Provider    History     Patient Active Problem List   Diagnosis Code    Severe obesity ([facility] [address] E66.01    Chronic sciatica M54.30    Degenerative joint disease involving multiple joints M15.9    Discharge of eye H57.89    Essential hypertension I10    Gastroesophageal reflux disease K21.9    Long term current use of systemic steroids Z79.52    Mammogram abnormal R92.8    Motion sickness T75. 3XXA    Neck pain M54.2    Osteoarthritis of knee M17.10    Osteoarthritis of multiple joints M15.9    Pain in forearm M79.639    Pain in right arm M79.601    Pain in joint of right shoulder M25.511    Polymyalgia rheumatica (HCC) M35.3    Sleep apnea G47.30    Spinal stenosis of lumbar region M48.061    Diet-controlled type 2 diabetes mellitus (HCC) E11.9    Vitamin D deficiency E55.9    Wound of skin T14. 8XXA    Abnormal mammogram of left breast R92.8    Abnormal mammogram R92.8    Allergic rhinitis, unspecified seasonality, unspecified trigger J30.9     Past Medical History:   Diagnosis Date    Bronchitis     Chronic sciatica 7/23/2020    Degenerative joint disease involving multiple joints 7/23/2020    Diet-controlled type 2 diabetes mellitus ([facility] 7/23/2020    Discharge of eye 7/23/2020    Essential hypertension 7/23/2020    Gastroesophageal reflux disease 7/23/2020    Long term current use of systemic steroids 7/23/2020    Mammogram abnormal 7/23/2020    Morbid obesity ([facility] 7/23/2020    Motion sickness 7/23/2020    Neck pain 7/23/2020    Osteoarthritis of knee 7/23/2020    Osteoarthritis of multiple joints 7/23/2020    Pain in forearm 7/23/2020    Pain in joint of right shoulder 7/23/2020    Pain in right arm 7/23/2020    Polymyalgia rheumatica ([facility] [address] 7/23/2020    Screening for cholesterol level 7/23/2020    Screening for malignant neoplasm of breast 7/23/2020    Severe obesity ([facility] 7/23/2020    Sleep apnea 7/23/2020    Spinal stenosis of lumbar region 7/23/2020    Vitamin D deficiency 7/23/2020    Wound of skin 7/23/2020      Past Surgical History:   Procedure Laterality Date    HX BREAST BIOPSY Right     benign    HX COLONOSCOPY      HX ORTHOPAEDIC Right 09/15/2021    carpal tunnel surgery of R hand    HX ORTHOPAEDIC Left 07/15/2021    carpal tunnel surgery of L hand     Current Outpatient Medications   Medication Sig Dispense Refill    pregabalin (LYRICA) 150 mg capsule Take 1 Capsule by mouth two (2) times a day. Max Daily Amount: 300 mg. 180 Capsule 1    metFORMIN ER (GLUCOPHAGE XR) 500 mg tablet TAKE 1 TABLET BY MOUTH ONCE DAILY WITH MEALS 90 Tablet 2    cetirizine (ZYRTEC) 10 mg tablet Take 1 tablet by mouth once daily 90 Tab 1    lisinopril-hydroCHLOROthiazide (PRINZIDE, ZESTORETIC) 20-12.5 mg per tablet Take 1 Tab by mouth Every morning. 90 Tab 3    fluticasone propionate (FLONASE) 50 mcg/actuation nasal spray 2 Sprays by Both Nostrils route daily. 1 Bottle 2    aspirin delayed-release 81 mg tablet Take  by mouth daily.  azaTHIOprine (IMURAN) 50 mg tablet Take 50 mg by mouth daily.  folic acid/multivit-min/lutein (CENTRUM SILVER PO) Take  by mouth.  diclofenac (VOLTAREN) 1 % gel Apply  to affected area four (4) times daily.       cholecalciferol (Vitamin D3) 25 mcg (1,000 unit) cap Take  by mouth daily.  lancets (Accu-Chek Softclix Lancets) misc Use one lancet to check glucose once daily before breakfast. Dx: E11.9 (Patient not taking: Reported on 10/29/2021) 100 Each 1    Blood-Glucose Meter monitoring kit To check blood sugar once a day before breakfast. (Patient not taking: Reported on 10/29/2021) 1 Kit 1    glucose blood VI test strips (blood glucose test) strip To check blood sugar once a day before breakfast. (Patient not taking: Reported on 10/29/2021) 100 Strip 3    lancets misc To check blood sugar once a day before breakfast. (Patient not taking: Reported on 10/29/2021) 100 Each 3     Allergies   Allergen Reactions    Bactrim [Sulfamethoprim] Itching       Family History   Problem Relation Age of Onset    Heart Disease Mother     Diabetes Mother     Stroke Mother     Heart Disease Father     Stroke Father     Lupus Sister      Social History     Tobacco Use    Smoking status: Never Smoker    Smokeless tobacco: Never Used   Substance Use Topics    Alcohol use: Not Currently       This is a combined visit. Subsequent Medicare wellness visit. Prevnar 13 given in the office. Up to date for Pneumovax 23 taken in 2019, she is going to get high-dose flu vaccine from the pharmacy. She is up to date for flu vaccine taken in 2020. Recommended to get Shingrix vaccine 2 days from pharmacy. Recommended to get Td vaccine from pharmacy. She had taken hepatitis B vaccine in the past.  She is up-to-date with her mammogram done in March 2021. She has done her bone density which was normal in December 2018 with her rheumatologist.  She is up-to-date with colonoscopy done in January 2019 at [facility] with Dr. Emily Heerdia diverticulosis seen in sigmoid colon. Eye exam was done in June 2021. She has no depression. She has limited mobility because of her lumbar spinal stenosis and history of sciatica. She is getting  pregabalin.   She follows rheumatologist.  She has mini cog assessment normal with normal clock drawing and normal 3 words recall. No mental health problems. No depression. Regular follow-up. Hypertension controlled continue lisinopril hydrochlorothiazide 20/12.5 mg once a day. Diet low in sodium. Type 2 diabetes mellitus not getting controlled hemoglobin A1c 6.5 after being on Metformin ER she takes 500 mg once a day. Diet low in starch and sugar and portion control calorie restricted diet. Continue low-dose aspirin. She does not take statin. Lipid profile ordered. History of hypercholesteremia she had minimally elevated LDL because of diabetes I will order lipid profile again. She has also other comorbidities. Allergic rhinitis taking cetirizine and fluticasone nasal spray. Lumbar spinal stenosis with history of sciatica and radiculopathy she is feeling better taking pregabalin 150 mg twice a day now she is not on gabapentin. Polymyalgia rheumatica under care of rheumatologist getting azathioprine. Continue multivitamin containing vitamin D and calcium. Labs ordered for next visit. Follow-up in 3 months. Labs done in July reviewed again with her. Up to date for Covid vaccine going to get Covid booster dose later on. Discussed in length about her vaccinations to be taken. Not able to lose weight her BMI is 44.67 healthy lifestyle,. Prevnar 13 vaccine given in the office today.     Marci Valdez

## 2021-10-29 NOTE — PATIENT INSTRUCTIONS
Medicare Wellness Visit, Female     The best way to live healthy is to have a lifestyle where you eat a well-balanced diet, exercise regularly, limit alcohol use, and quit all forms of tobacco/nicotine, if applicable. Regular preventive services are another way to keep healthy. Preventive services (vaccines, screening tests, monitoring & exams) can help personalize your care plan, which helps you manage your own care. Screening tests can find health problems at the earliest stages, when they are easiest to treat. [facility] follows the current, evidence-based guidelines published by the [facility] ([facility]) when recommending preventive services for our patients. Because we follow these guidelines, sometimes recommendations change over time as research supports it. (For example, mammograms used to be recommended annually. Even though Medicare will still pay for an annual mammogram, the newer guidelines recommend a mammogram every two years for women of average risk). Of course, you and your doctor may decide to screen more often for some diseases, based on your risk and your co-morbidities (chronic disease you are already diagnosed with). Preventive services for you include:  - Medicare offers their members a free annual wellness visit, which is time for you and your primary care provider to discuss and plan for your preventive service needs. Take advantage of this benefit every year!  -All adults over the age of 72 should receive the recommended pneumonia vaccines. Current USPSTF guidelines recommend a series of two vaccines for the best pneumonia protection.   -All adults should have a flu vaccine yearly and a tetanus vaccine every 10 years.   -All adults age 48 and older should receive the shingles vaccines (series of two vaccines).       -All adults age 38-68 who are overweight should have a diabetes screening test once every three years.   -All adults born between [birth year] and 1965 should be screened once for Hepatitis C.  -Other screening tests and preventive services for persons with diabetes include: an eye exam to screen for diabetic retinopathy, a kidney function test, a foot exam, and stricter control over your cholesterol.   -Cardiovascular screening for adults with routine risk involves an electrocardiogram (ECG) at intervals determined by your doctor.   -Colorectal cancer screenings should be done for adults age 54-65 with no increased risk factors for colorectal cancer. There are a number of acceptable methods of screening for this type of cancer. Each test has its own benefits and drawbacks. Discuss with your doctor what is most appropriate for you during your annual wellness visit. The different tests include: colonoscopy (considered the best screening method), a fecal occult blood test, a fecal DNA test, and sigmoidoscopy.    -A bone mass density test is recommended when a woman turns 65 to screen for osteoporosis. This test is only recommended one time, as a screening. Some providers will use this same test as a disease monitoring tool if you already have osteoporosis. -Breast cancer screenings are recommended every other year for women of normal risk, age 54-69.  -Cervical cancer screenings for women over age 72 are only recommended with certain risk factors.      Here is a list of your current Health Maintenance items (your personalized list of preventive services) with a due date:  Health Maintenance Due   Topic Date Due    Hepatitis C Test  Never done    Diabetic Foot Care  Never done    Albumin Urine Test  Never done    DTaP/Tdap/Td  (1 - Tdap) Never done    Shingles Vaccine (1 of 2) Never done    Bone Mineral Density   Never done    COVID-19 Vaccine (3 - Moderna risk 3-dose series) 04/17/2021    Yearly Flu Vaccine (1) 09/01/2021

## 2022-01-26 LAB
ALBUMIN SERPL-MCNC: 4.3 G/DL (ref 3.7–4.7)
ALBUMIN/GLOB SERPL: 1.8 {RATIO} (ref 1.2–2.2)
ALP SERPL-CCNC: 85 IU/L (ref 44–121)
ALT SERPL-CCNC: 16 IU/L (ref 0–32)
AST SERPL-CCNC: 21 IU/L (ref 0–40)
BASOPHILS # BLD AUTO: 0 X10E3/UL (ref 0–0.2)
BASOPHILS NFR BLD AUTO: 1 %
BILIRUB SERPL-MCNC: 0.3 MG/DL (ref 0–1.2)
BUN SERPL-MCNC: 16 MG/DL (ref 8–27)
BUN/CREAT SERPL: 17 (ref 12–28)
CALCIUM SERPL-MCNC: 9.9 MG/DL (ref 8.7–10.3)
CHLORIDE SERPL-SCNC: 105 MMOL/L (ref 96–106)
CHOLEST SERPL-MCNC: 219 MG/DL (ref 100–199)
CO2 SERPL-SCNC: 25 MMOL/L (ref 20–29)
CREAT SERPL-MCNC: 0.92 MG/DL (ref 0.57–1)
EOSINOPHIL # BLD AUTO: 0.1 X10E3/UL (ref 0–0.4)
EOSINOPHIL NFR BLD AUTO: 2 %
ERYTHROCYTE [DISTWIDTH] IN BLOOD BY AUTOMATED COUNT: 14.9 % (ref 11.7–15.4)
EST. AVERAGE GLUCOSE BLD GHB EST-MCNC: 137 MG/DL
GLOBULIN SER CALC-MCNC: 2.4 G/DL (ref 1.5–4.5)
GLUCOSE SERPL-MCNC: 113 MG/DL (ref 65–99)
HBA1C MFR BLD: 6.4 % (ref 4.8–5.6)
HCT VFR BLD AUTO: 35.1 % (ref 34–46.6)
HDLC SERPL-MCNC: 75 MG/DL
HGB BLD-MCNC: 11 G/DL (ref 11.1–15.9)
IMM GRANULOCYTES # BLD AUTO: 0 X10E3/UL (ref 0–0.1)
IMM GRANULOCYTES NFR BLD AUTO: 1 %
LDLC SERPL CALC-MCNC: 129 MG/DL (ref 0–99)
LYMPHOCYTES # BLD AUTO: 1.7 X10E3/UL (ref 0.7–3.1)
LYMPHOCYTES NFR BLD AUTO: 31 %
MCH RBC QN AUTO: 25.8 PG (ref 26.6–33)
MCHC RBC AUTO-ENTMCNC: 31.3 G/DL (ref 31.5–35.7)
MCV RBC AUTO: 82 FL (ref 79–97)
MONOCYTES # BLD AUTO: 0.6 X10E3/UL (ref 0.1–0.9)
MONOCYTES NFR BLD AUTO: 11 %
NEUTROPHILS # BLD AUTO: 3 X10E3/UL (ref 1.4–7)
NEUTROPHILS NFR BLD AUTO: 54 %
PLATELET # BLD AUTO: 274 X10E3/UL (ref 150–450)
POTASSIUM SERPL-SCNC: 4.5 MMOL/L (ref 3.5–5.2)
PROT SERPL-MCNC: 6.7 G/DL (ref 6–8.5)
RBC # BLD AUTO: 4.27 X10E6/UL (ref 3.77–5.28)
SODIUM SERPL-SCNC: 142 MMOL/L (ref 134–144)
TRIGL SERPL-MCNC: 86 MG/DL (ref 0–149)
VLDLC SERPL CALC-MCNC: 15 MG/DL (ref 5–40)
WBC # BLD AUTO: 5.4 X10E3/UL (ref 3.4–10.8)

## 2022-01-26 NOTE — PROGRESS NOTES
Please call Ms. Abby Sotomayor that her hemoglobin is slightly low most likely due to anemia due to chronic disease that she has. Please take over-the-counter vitamin B12, 1000 mcg/day over-the-counter folic acid 1 mg/day and iron once a day, if she likes to take or diet rich in green leafy vegetables and ironHer sugar is better than before 6.4That his average blood sugar before that it was 6.6. Luz Downs Her cholesterol is slightly elevated diet low in fat or otherwise pravastatin 10 mg at bedtime and, her kidney function liver function and electrolytes including potassium normal.

## 2022-02-02 ENCOUNTER — OFFICE VISIT (OUTPATIENT)
Dept: INTERNAL MEDICINE CLINIC | Age: 77
End: 2022-02-02
Payer: MEDICARE

## 2022-02-02 VITALS
OXYGEN SATURATION: 98 % | DIASTOLIC BLOOD PRESSURE: 62 MMHG | RESPIRATION RATE: 18 BRPM | HEART RATE: 74 BPM | BODY MASS INDEX: 45.5 KG/M2 | HEIGHT: 61 IN | WEIGHT: 241 LBS | SYSTOLIC BLOOD PRESSURE: 140 MMHG

## 2022-02-02 DIAGNOSIS — M48.061 SPINAL STENOSIS OF LUMBAR REGION, UNSPECIFIED WHETHER NEUROGENIC CLAUDICATION PRESENT: ICD-10-CM

## 2022-02-02 DIAGNOSIS — E78.00 PURE HYPERCHOLESTEROLEMIA: ICD-10-CM

## 2022-02-02 DIAGNOSIS — M35.3 POLYMYALGIA RHEUMATICA (HCC): ICD-10-CM

## 2022-02-02 DIAGNOSIS — M54.30 CHRONIC SCIATICA, UNSPECIFIED LATERALITY: ICD-10-CM

## 2022-02-02 DIAGNOSIS — E11.9 DIET-CONTROLLED TYPE 2 DIABETES MELLITUS (HCC): ICD-10-CM

## 2022-02-02 DIAGNOSIS — I10 ESSENTIAL HYPERTENSION: ICD-10-CM

## 2022-02-02 DIAGNOSIS — J30.9 ALLERGIC RHINITIS, UNSPECIFIED SEASONALITY, UNSPECIFIED TRIGGER: ICD-10-CM

## 2022-02-02 DIAGNOSIS — M15.9 PRIMARY OSTEOARTHRITIS INVOLVING MULTIPLE JOINTS: ICD-10-CM

## 2022-02-02 DIAGNOSIS — E11.9 CONTROLLED TYPE 2 DIABETES MELLITUS WITHOUT COMPLICATION, WITHOUT LONG-TERM CURRENT USE OF INSULIN (HCC): ICD-10-CM

## 2022-02-02 DIAGNOSIS — G47.30 SLEEP APNEA, UNSPECIFIED TYPE: ICD-10-CM

## 2022-02-02 LAB — GLUCOSE POC: 108 MG/DL

## 2022-02-02 PROCEDURE — G8536 NO DOC ELDER MAL SCRN: HCPCS | Performed by: INTERNAL MEDICINE

## 2022-02-02 PROCEDURE — G8417 CALC BMI ABV UP PARAM F/U: HCPCS | Performed by: INTERNAL MEDICINE

## 2022-02-02 PROCEDURE — G8399 PT W/DXA RESULTS DOCUMENT: HCPCS | Performed by: INTERNAL MEDICINE

## 2022-02-02 PROCEDURE — 1090F PRES/ABSN URINE INCON ASSESS: CPT | Performed by: INTERNAL MEDICINE

## 2022-02-02 PROCEDURE — 1101F PT FALLS ASSESS-DOCD LE1/YR: CPT | Performed by: INTERNAL MEDICINE

## 2022-02-02 PROCEDURE — G8753 SYS BP > OR = 140: HCPCS | Performed by: INTERNAL MEDICINE

## 2022-02-02 PROCEDURE — G8427 DOCREV CUR MEDS BY ELIG CLIN: HCPCS | Performed by: INTERNAL MEDICINE

## 2022-02-02 PROCEDURE — G8510 SCR DEP NEG, NO PLAN REQD: HCPCS | Performed by: INTERNAL MEDICINE

## 2022-02-02 PROCEDURE — 99214 OFFICE O/P EST MOD 30 MIN: CPT | Performed by: INTERNAL MEDICINE

## 2022-02-02 PROCEDURE — G8754 DIAS BP LESS 90: HCPCS | Performed by: INTERNAL MEDICINE

## 2022-02-02 PROCEDURE — 82962 GLUCOSE BLOOD TEST: CPT | Performed by: INTERNAL MEDICINE

## 2022-02-02 NOTE — PROGRESS NOTES
Chief Complaint   Patient presents with    Follow Up Chronic Condition                  Visit Vitals  BP (!) 140/62 (BP 1 Location: Left upper arm, BP Patient Position: Sitting)   Pulse 74   Resp 18   Ht 5' 1\" (1.549 m)   Wt 241 lb (109.3 kg)   SpO2 98%   BMI 45.54 kg/m²           1. Have you been to the ER, urgent care clinic since your last visit? Hospitalized since your last visit? No    2. Have you seen or consulted any other health care providers outside of the [address] [place] [address] since your last visit? Include any pap smears or colon screening.  No

## 2022-02-02 NOTE — PROGRESS NOTES
Matthew Asif is a 68 y.o. female and presents with Follow Up Chronic Condition ()    Ms. Norm Chu came for regular follow-up according to her she has consulted hematologist for anemia and leukocytosis and she was referred to urologist by her rheumatologist and she told me she underwent some procedure that she does not recall but she told me that it was not cancer and I reviewed that she had consulted hematologist Lynne Ortiz in the past but  we will try to get the most recent notes. She told me she follows some urologist and the test results was negative. She gets pregabalin for history of sciatica and back pain. She has polymyalgia rheumatica and taking Imuran. She does not take any steroids. Her blood pressure is controlled. Her BMI is 45.54. She has done her labs and her hemoglobin A1c decreased 6.4% from 6.7% in February after being on Metformin  mg/day. She told me that pregabalin is working somewhat. She has mild elevated lipid profile she is not on statin. The lab was done in October and her all other labs are monitored by rheumatologist so I will repeat the labs next time. Her random blood sugar is 108 in the office. ,She has taken COVID vaccine. She has no depression. She told me she had persistent elevated ESR that is why she had referred to hematologist and she underwent CT chest without contrast which was unremarkable.,    Review of Systems    Review of Systems   Constitutional: Negative. HENT: Negative for sinus pain and sore throat. Eyes: Negative for blurred vision. Respiratory: Negative for cough and wheezing. Cardiovascular: Negative. Gastrointestinal: Negative. Genitourinary: Negative. Musculoskeletal: Negative for back pain. DJD and lumbar spinal stenosis. h/o chronic back pain. polymyalgia rheumatica. Neurological: Negative for dizziness, tingling, tremors, sensory change and headaches. Psychiatric/Behavioral: Negative for depression.  The patient is not nervous/anxious and does not have insomnia.          Past Medical History:   Diagnosis Date    Bronchitis     Chronic sciatica 7/23/2020    Degenerative joint disease involving multiple joints 7/23/2020    Diet-controlled type 2 diabetes mellitus ([address] 7/23/2020    Discharge of eye 7/23/2020    Essential hypertension 7/23/2020    Gastroesophageal reflux disease 7/23/2020    Long term current use of systemic steroids 7/23/2020    Mammogram abnormal 7/23/2020    Morbid obesity ([address] 7/23/2020    Motion sickness 7/23/2020    Neck pain 7/23/2020    Osteoarthritis of knee 7/23/2020    Osteoarthritis of multiple joints 7/23/2020    Pain in forearm 7/23/2020    Pain in joint of right shoulder 7/23/2020    Pain in right arm 7/23/2020    Polymyalgia rheumatica ([address] 7/23/2020    Pure hypercholesterolemia 2/2/2022    Screening for cholesterol level 7/23/2020    Screening for malignant neoplasm of breast 7/23/2020    Severe obesity ([address] 7/23/2020    Sleep apnea 7/23/2020    Spinal stenosis of lumbar region 7/23/2020    Vitamin D deficiency 7/23/2020    Wound of skin 7/23/2020     Past Surgical History:   Procedure Laterality Date    HX BREAST BIOPSY Right     benign    HX COLONOSCOPY      HX ORTHOPAEDIC Right 09/15/2021    carpal tunnel surgery of R hand    HX ORTHOPAEDIC Left 07/15/2021    carpal tunnel surgery of L hand     Social History     Socioeconomic History    Marital status:    Tobacco Use    Smoking status: Never Smoker    Smokeless tobacco: Never Used   Substance and Sexual Activity    Alcohol use: Not Currently    Drug use: Never     Family History   Problem Relation Age of Onset    Heart Disease Mother     Diabetes Mother     Stroke Mother     Heart Disease Father     Stroke Father     Lupus Sister      Current Outpatient Medications   Medication Sig Dispense Refill    lisinopril-hydroCHLOROthiazide (PRINZIDE, ZESTORETIC) 20-12.5 mg per tablet TAKE 1 TABLET BY MOUTH ONCE DAILY IN THE MORNING 90 Tablet 1    pregabalin (LYRICA) 150 mg capsule TAKE 1 CAPSULE BY MOUTH TWICE DAILY MAX  DAILY  AMOUNT  300   Capsule 1    metFORMIN ER (GLUCOPHAGE XR) 500 mg tablet TAKE 1 TABLET BY MOUTH ONCE DAILY WITH MEALS 90 Tablet 2    cetirizine (ZYRTEC) 10 mg tablet Take 1 tablet by mouth once daily 90 Tab 1    fluticasone propionate (FLONASE) 50 mcg/actuation nasal spray 2 Sprays by Both Nostrils route daily. 1 Bottle 2    aspirin delayed-release 81 mg tablet Take  by mouth daily.  azaTHIOprine (IMURAN) 50 mg tablet Take 50 mg by mouth two (2) times a day.  folic acid/multivit-min/lutein (CENTRUM SILVER PO) Take  by mouth.  diclofenac (VOLTAREN) 1 % gel Apply  to affected area four (4) times daily.  cholecalciferol (Vitamin D3) 25 mcg (1,000 unit) cap Take  by mouth daily. Allergies   Allergen Reactions    Bactrim [Sulfamethoprim] Itching       Objective:  Visit Vitals  BP (!) 140/62 (BP 1 Location: Left upper arm, BP Patient Position: Sitting)   Pulse 74   Resp 18   Ht 5' 1\" (1.549 m)   Wt 241 lb (109.3 kg)   SpO2 98%   BMI 45.54 kg/m²       Physical Exam:   Constitutional: General Appearance: Morbid obese. Pleasant. Level of Distress: NAD. Psychiatric: Mental Status: normal mood and affect Orientation: to time, place, and person. ,normal eye contact. Head: Head: normocephalic and atraumatic. Eyes: Pupils: PERRLA. Sclerae: non-icteric. Neck: Neck: supple, trachea midline, and no masses. Lymph Nodes: no cervical LAD. Thyroid: no enlargement or nodules and non-tender. Lungs: Respiratory effort: no dyspnea. Auscultation: no wheezing, rales/crackles, or rhonchi and breath sounds normal and good air movement. Cardiovascular: Apical Impulse: not displaced. Heart Auscultation: normal S1 and S2; no murmurs, rubs, or gallops; and RRR. Neck vessels: no carotid bruits. Pulses including femoral / pedal: normal throughout. Abdomen:  Bowel Sounds: normal. Inspection and Palpation: no tenderness, guarding, or masses and soft and non-distended. Liver: non-tender and no hepatomegaly. Spleen: non-tender and no splenomegaly. Musculoskeletal[de-identified] Extremities: no edema,no varicosities. No Calf tenderness. Neurologic: Gait and Station: normal gait and station. Motor Strength normal right and left. Skin: Inspection and palpation: no rash, lesions, or ulcer. Results for orders placed or performed in visit on 02/02/22   AMB POC GLUCOSE BLOOD, BY GLUCOSE MONITORING DEVICE   Result Value Ref Range    Glucose  MG/DL       Assessment/Plan:      ICD-10-CM ICD-9-CM    1. Essential hypertension  I10 401.9    2. Controlled type 2 diabetes mellitus without complication, without long-term current use of insulin ([facility])  E11.9 250.00    3. Chronic sciatica, unspecified laterality  M54.30 724.3    4. Polymyalgia rheumatica ([facility])  M35.3 725    5. Spinal stenosis of lumbar region, unspecified whether neurogenic claudication present  M48.061 724.02    6. Pure hypercholesterolemia  E78.00 272.0    7. Allergic rhinitis, unspecified seasonality, unspecified trigger  J30.9 477.9    8. Sleep apnea, unspecified type  G47.30 780.57    9. Primary osteoarthritis involving multiple joints  M89.49 715.98    10. Diet-controlled type 2 diabetes mellitus ([facility])  E11.9 250.00 AMB POC GLUCOSE BLOOD, BY GLUCOSE MONITORING DEVICE     Orders Placed This Encounter    AMB POC GLUCOSE BLOOD, BY GLUCOSE MONITORING DEVICE       Hypertension controlled. Continued on lisinopril hydrochlorothiazide 20/12.5 mg once a day. Diet low in sodium. Hypercholesteremia she is not on statin recommended to cut back on fried food and to have a calorie restricted 1400-calorie diet before being on statin I will repeat her lipid profile she wants to control with her lifestyle. Type 2 diabetes mellitus hemoglobin A1c decreased to 6.4% from 6.7% after being on Metformin  mg/day.   Random blood sugar is 108. Diet low in starch sugar and  she is not doing that much physical activity having polymyalgia rheumatica and chronic back pain and history of sciatica. Regular ophthalmologic checkup. I have prepared her mentally to be on low-dose of statin continue low-dose aspirin and I may start her on pravastatin 10 mg at bedtime. Next visit. Polymyalgia rheumatica getting Imuran and under care of Dr. Mona Tom rheumatologist will order CT chest  at [facility] which was unremarkable and she had persistent elevated ESR so she was referred to hematologist and she told me she went some procedure which was normal I do not know I tried to see in media in epic but I could not get it. She does not know the name of the physicians that she has seen but she has seen and consulted Dr. Billy Carrion. DJD of joints she uses diclofenac gel and pregabalin. Which helps for sciatica. Allergic rhinitis using cetirizine and fluticasone nasal spray. Anemia may be due to chronic disease she is not on steroid. Continue i folic acid and vitamin B12. Morbid obesity with BMI 45.54 restricted calorie intake 1400-calorie diet plan physical activity as much as she can tolerate. Follow-up in 3 months. Lab results explained to her. lose weight, follow low fat diet, follow low salt diet, continue present plan, call if any problems    There are no Patient Instructions on file for this visit. Follow-up and Dispositions    · Return in about 3 months (around 5/2/2022) for diabetes, hypertension,cholesterol follow up.

## 2022-03-18 PROBLEM — M48.061 SPINAL STENOSIS OF LUMBAR REGION: Status: ACTIVE | Noted: 2020-07-23

## 2022-03-18 PROBLEM — Z79.52 LONG TERM CURRENT USE OF SYSTEMIC STEROIDS: Status: ACTIVE | Noted: 2020-07-23

## 2022-03-18 PROBLEM — T75.3XXA MOTION SICKNESS: Status: ACTIVE | Noted: 2020-07-23

## 2022-03-18 PROBLEM — G47.30 SLEEP APNEA: Status: ACTIVE | Noted: 2020-07-23

## 2022-03-18 PROBLEM — R92.8 ABNORMAL MAMMOGRAM: Status: ACTIVE | Noted: 2021-02-16

## 2022-03-18 PROBLEM — K21.9 GASTROESOPHAGEAL REFLUX DISEASE: Status: ACTIVE | Noted: 2020-07-23

## 2022-03-18 PROBLEM — M25.511 PAIN IN JOINT OF RIGHT SHOULDER: Status: ACTIVE | Noted: 2020-07-23

## 2022-03-18 PROBLEM — R92.8 MAMMOGRAM ABNORMAL: Status: ACTIVE | Noted: 2020-07-23

## 2022-03-18 PROBLEM — T14.8XXA WOUND OF SKIN: Status: ACTIVE | Noted: 2020-07-23

## 2022-03-18 PROBLEM — M54.30 CHRONIC SCIATICA: Status: ACTIVE | Noted: 2020-07-23

## 2022-03-18 PROBLEM — E11.9 CONTROLLED TYPE 2 DIABETES MELLITUS WITHOUT COMPLICATION, WITHOUT LONG-TERM CURRENT USE OF INSULIN (HCC): Status: ACTIVE | Noted: 2020-07-23

## 2022-03-18 PROBLEM — M79.639 PAIN IN FOREARM: Status: ACTIVE | Noted: 2020-07-23

## 2022-03-19 PROBLEM — R92.8 ABNORMAL MAMMOGRAM OF LEFT BREAST: Status: ACTIVE | Noted: 2020-11-16

## 2022-03-19 PROBLEM — M15.9 OSTEOARTHRITIS OF MULTIPLE JOINTS: Status: ACTIVE | Noted: 2020-07-23

## 2022-03-19 PROBLEM — M54.2 NECK PAIN: Status: ACTIVE | Noted: 2020-07-23

## 2022-03-19 PROBLEM — M35.3 POLYMYALGIA RHEUMATICA (HCC): Status: ACTIVE | Noted: 2020-07-23

## 2022-03-19 PROBLEM — E55.9 VITAMIN D DEFICIENCY: Status: ACTIVE | Noted: 2020-07-23

## 2022-03-19 PROBLEM — E78.00 PURE HYPERCHOLESTEROLEMIA: Status: ACTIVE | Noted: 2022-02-02

## 2022-03-19 PROBLEM — J30.9 ALLERGIC RHINITIS, UNSPECIFIED SEASONALITY, UNSPECIFIED TRIGGER: Status: ACTIVE | Noted: 2021-07-29

## 2022-03-19 PROBLEM — H57.89 DISCHARGE OF EYE: Status: ACTIVE | Noted: 2020-07-23

## 2022-03-19 PROBLEM — I10 ESSENTIAL HYPERTENSION: Status: ACTIVE | Noted: 2020-07-23

## 2022-03-20 PROBLEM — M15.9 DEGENERATIVE JOINT DISEASE INVOLVING MULTIPLE JOINTS: Status: ACTIVE | Noted: 2020-07-23

## 2022-03-20 PROBLEM — M79.601 PAIN IN RIGHT ARM: Status: ACTIVE | Noted: 2020-07-23

## 2022-03-20 PROBLEM — E66.01 SEVERE OBESITY (HCC): Status: ACTIVE | Noted: 2020-07-23

## 2022-03-20 PROBLEM — M17.9 OSTEOARTHRITIS OF KNEE: Status: ACTIVE | Noted: 2020-07-23

## 2022-05-09 ENCOUNTER — TRANSCRIBE ORDER (OUTPATIENT)
Dept: SCHEDULING | Age: 77
End: 2022-05-09

## 2022-05-09 DIAGNOSIS — Z12.31 VISIT FOR SCREENING MAMMOGRAM: Primary | ICD-10-CM

## 2022-05-18 ENCOUNTER — HOSPITAL ENCOUNTER (OUTPATIENT)
Dept: MAMMOGRAPHY | Age: 77
Discharge: HOME | End: 2022-05-18
Attending: INTERNAL MEDICINE
Payer: MEDICARE

## 2022-05-18 DIAGNOSIS — Z12.31 VISIT FOR SCREENING MAMMOGRAM: ICD-10-CM

## 2022-05-18 PROCEDURE — 77063 BREAST TOMOSYNTHESIS BI: CPT

## 2022-05-26 ENCOUNTER — TELEPHONE (OUTPATIENT)
Dept: INTERNAL MEDICINE CLINIC | Age: 77
End: 2022-05-26

## 2022-05-26 NOTE — TELEPHONE ENCOUNTER
----- Message from Rebekah Medley sent at 5/26/2022 12:30 PM EDT -----  Subject: Message to Provider    QUESTIONS  Information for Provider? Linda Harkins with [employer] called in, they sent over   pre authorization paperwork for patients glucose monitor they are looking   for an update on this.   ---------------------------------------------------------------------------  --------------  CALL BACK INFO  What is the best way for the office to contact you? Do not leave any   message, patient will call back for answer  Preferred Call Back Phone Number? [phone]  ---------------------------------------------------------------------------  --------------  SCRIPT ANSWERS  Relationship to Patient? Third Party  Third Party Type? Pharmacy? Representative Name?  [address]

## 2022-06-05 NOTE — TELEPHONE ENCOUNTER
Received documentation, patient does not have diabetes per Dr. Lowery Matters. Does not need freestyle disha. Declined documentation faxed back.

## 2022-06-09 ENCOUNTER — PATIENT OUTREACH (OUTPATIENT)
Dept: CASE MANAGEMENT | Age: 77
End: 2022-06-09

## 2022-06-09 NOTE — PROGRESS NOTES
06/09/22    01:41 pm    ACM made attempt to contact the patient to offer ACM services. Patient did not answer the call, left a  with contact information for a return call.       06/13/22  4:33 pm    Patient denied need for ACM services, but asked if she could get a sooner appointment that on August 17, 2022 believes she is behind on her annual care needs. ACM to rout message to PCP office requesting a sooner appointment and a refill of her prescription for lisinopril-hydroCHLOROthiazide (PRINZIDE, ZESTORETIC) 20-12.5 mg per tab. Ambulatory Care Management Note    Date/Time:  6/13/2022 5:32 PM    This patient was received as a referral from Daily assignment. Ambulatory Care Manager outreached to patient today to offer care management services. Introduction to self and role of care manager provided. Patient declined care management services at this time. No follow up call scheduled at this time. Patient has Ambulatory Care Manager's contact number for for any questions or concerns.        Carlos Grissom RN BSN - Ambulatory Care Manager - ph. [phone]

## 2022-06-14 ENCOUNTER — OFFICE VISIT (OUTPATIENT)
Dept: INTERNAL MEDICINE CLINIC | Age: 77
End: 2022-06-14
Payer: MEDICARE

## 2022-06-14 VITALS
HEART RATE: 65 BPM | RESPIRATION RATE: 16 BRPM | DIASTOLIC BLOOD PRESSURE: 58 MMHG | BODY MASS INDEX: 38.36 KG/M2 | TEMPERATURE: 97 F | SYSTOLIC BLOOD PRESSURE: 126 MMHG | WEIGHT: 203.2 LBS | OXYGEN SATURATION: 100 % | HEIGHT: 61 IN

## 2022-06-14 DIAGNOSIS — M15.9 PRIMARY OSTEOARTHRITIS INVOLVING MULTIPLE JOINTS: ICD-10-CM

## 2022-06-14 DIAGNOSIS — E78.00 PURE HYPERCHOLESTEROLEMIA: ICD-10-CM

## 2022-06-14 DIAGNOSIS — M35.3 POLYMYALGIA RHEUMATICA (HCC): ICD-10-CM

## 2022-06-14 DIAGNOSIS — J30.9 ALLERGIC RHINITIS, UNSPECIFIED SEASONALITY, UNSPECIFIED TRIGGER: ICD-10-CM

## 2022-06-14 DIAGNOSIS — I10 ESSENTIAL HYPERTENSION: ICD-10-CM

## 2022-06-14 DIAGNOSIS — Z78.9 STATIN INTOLERANCE: ICD-10-CM

## 2022-06-14 DIAGNOSIS — E11.9 CONTROLLED TYPE 2 DIABETES MELLITUS WITHOUT COMPLICATION, WITHOUT LONG-TERM CURRENT USE OF INSULIN (HCC): ICD-10-CM

## 2022-06-14 DIAGNOSIS — E66.01 SEVERE OBESITY (BMI 35.0-39.9) WITH COMORBIDITY (HCC): ICD-10-CM

## 2022-06-14 DIAGNOSIS — M48.061 SPINAL STENOSIS OF LUMBAR REGION, UNSPECIFIED WHETHER NEUROGENIC CLAUDICATION PRESENT: ICD-10-CM

## 2022-06-14 DIAGNOSIS — Z11.59 NEED FOR HEPATITIS C SCREENING TEST: ICD-10-CM

## 2022-06-14 DIAGNOSIS — G47.30 SLEEP APNEA, UNSPECIFIED TYPE: ICD-10-CM

## 2022-06-14 PROBLEM — M54.2 NECK PAIN: Status: RESOLVED | Noted: 2020-07-23 | Resolved: 2022-06-14

## 2022-06-14 PROBLEM — M25.511 PAIN IN JOINT OF RIGHT SHOULDER: Status: RESOLVED | Noted: 2020-07-23 | Resolved: 2022-06-14

## 2022-06-14 PROBLEM — M79.639 PAIN IN FOREARM: Status: RESOLVED | Noted: 2020-07-23 | Resolved: 2022-06-14

## 2022-06-14 PROBLEM — T75.3XXA MOTION SICKNESS: Status: RESOLVED | Noted: 2020-07-23 | Resolved: 2022-06-14

## 2022-06-14 PROBLEM — T14.8XXA WOUND OF SKIN: Status: RESOLVED | Noted: 2020-07-23 | Resolved: 2022-06-14

## 2022-06-14 PROBLEM — M79.601 PAIN IN RIGHT ARM: Status: RESOLVED | Noted: 2020-07-23 | Resolved: 2022-06-14

## 2022-06-14 PROBLEM — H57.89 DISCHARGE OF EYE: Status: RESOLVED | Noted: 2020-07-23 | Resolved: 2022-06-14

## 2022-06-14 PROBLEM — R92.8 MAMMOGRAM ABNORMAL: Status: RESOLVED | Noted: 2020-07-23 | Resolved: 2022-06-14

## 2022-06-14 PROBLEM — M17.9 OSTEOARTHRITIS OF KNEE: Status: RESOLVED | Noted: 2020-07-23 | Resolved: 2022-06-14

## 2022-06-14 PROBLEM — R92.8 ABNORMAL MAMMOGRAM: Status: RESOLVED | Noted: 2021-02-16 | Resolved: 2022-06-14

## 2022-06-14 LAB
GLUCOSE POC: 118 MG/DL
HBA1C MFR BLD HPLC: 6.6 %

## 2022-06-14 PROCEDURE — G8427 DOCREV CUR MEDS BY ELIG CLIN: HCPCS | Performed by: INTERNAL MEDICINE

## 2022-06-14 PROCEDURE — 83036 HEMOGLOBIN GLYCOSYLATED A1C: CPT | Performed by: INTERNAL MEDICINE

## 2022-06-14 PROCEDURE — G8536 NO DOC ELDER MAL SCRN: HCPCS | Performed by: INTERNAL MEDICINE

## 2022-06-14 PROCEDURE — G8417 CALC BMI ABV UP PARAM F/U: HCPCS | Performed by: INTERNAL MEDICINE

## 2022-06-14 PROCEDURE — G8399 PT W/DXA RESULTS DOCUMENT: HCPCS | Performed by: INTERNAL MEDICINE

## 2022-06-14 PROCEDURE — 1090F PRES/ABSN URINE INCON ASSESS: CPT | Performed by: INTERNAL MEDICINE

## 2022-06-14 PROCEDURE — G8754 DIAS BP LESS 90: HCPCS | Performed by: INTERNAL MEDICINE

## 2022-06-14 PROCEDURE — 1101F PT FALLS ASSESS-DOCD LE1/YR: CPT | Performed by: INTERNAL MEDICINE

## 2022-06-14 PROCEDURE — G8510 SCR DEP NEG, NO PLAN REQD: HCPCS | Performed by: INTERNAL MEDICINE

## 2022-06-14 PROCEDURE — 82962 GLUCOSE BLOOD TEST: CPT | Performed by: INTERNAL MEDICINE

## 2022-06-14 PROCEDURE — G8752 SYS BP LESS 140: HCPCS | Performed by: INTERNAL MEDICINE

## 2022-06-14 PROCEDURE — 99214 OFFICE O/P EST MOD 30 MIN: CPT | Performed by: INTERNAL MEDICINE

## 2022-06-14 RX ORDER — LISINOPRIL AND HYDROCHLOROTHIAZIDE 12.5; 2 MG/1; MG/1
1 TABLET ORAL EVERY MORNING
Qty: 90 TABLET | Refills: 1 | Status: SHIPPED | OUTPATIENT
Start: 2022-06-14

## 2022-06-14 NOTE — PROGRESS NOTES
1. \"Have you been to the ER, urgent care clinic since your last visit? Hospitalized since your last visit? \" No    2. \"Have you seen or consulted any other health care providers outside of the [address] [place] [address] since your last visit? \" No     3. For patients aged 39-70: Has the patient had a colonoscopy / FIT/ Cologuard? No      If the patient is female:    4. For patients aged 41-77: Has the patient had a mammogram within the past 2 years? Yes - Care Gap present. Most recent result on file      5. For patients aged 21-65: Has the patient had a pap smear?  NA - based on age or sex     Visit Vitals  BP (!) 126/58 (BP 1 Location: Right arm)   Pulse 65   Temp 97 °F (36.1 °C) (Temporal)   Resp 16   Ht 5' 1\" (1.549 m)   Wt 203 lb 3.2 oz (92.2 kg)   SpO2 100%   BMI 38.39 kg/m²       Chief Complaint   Patient presents with    Follow Up Chronic Condition    Hypertension    Diabetes     bs 118

## 2022-06-14 NOTE — PROGRESS NOTES
I see she has an appointment scheduled today with Michelle John, also her medication was sent into the pharmacy this morning!

## 2022-06-14 NOTE — PROGRESS NOTES
Catalina Crum is a 68 y.o. female and presents with Follow Up Chronic Condition, Hypertension, and Diabetes (bs 118)    Ms. Radha Marinelli came for regular follow-up. In between her appointment was canceled and today I had scheduled her. Her blood pressure is controlled with current medication regimen. She takes metformin extended release 500 mg once a day with dinner and her fasting blood sugar 118 and hemoglobin A1c 6.8% in the office. She says that metformin is causing stomach upset so she cannot increase twice a day so metformin is stopped and explained and discussed various medications and decided to be on Januvia 50 mg once a day to begin with. She has polymyalgia rheumatica getting azathioprine under the care of rheumatologist Dr. Theresa Kumar? At Dr. Nathan Connor office. She had consulted Dr. Mackenzie/orthopedic in the past had received injection in the knee joint for osteoarthritis she has lumbar spinal stenosis having history of pain due to lumbar radiculopathy and sciatica she says pain is not worsening at the same time not having great improvement but she is able to tolerate but she wants to have a opinion from orthopedic spine surgeon that I referred to Dr. Chad Larsen. She cannot take statin which causes muscle pain. She is off from prednisone. Her BMI is 38.39. Monofilament test is negative in both feet. Review of Systems    Review of Systems   Constitutional: Negative. HENT: Negative for sinus pain and sore throat. Eyes: Negative for blurred vision. Respiratory: Negative for cough and wheezing. Cardiovascular: Negative. Gastrointestinal: Negative. Genitourinary: Negative. Musculoskeletal:        Djd/ls stenosis   Neurological: Negative for dizziness, tingling, tremors, sensory change and headaches. Psychiatric/Behavioral: Negative.          Past Medical History:   Diagnosis Date    Bronchitis     Chronic sciatica 7/23/2020    Degenerative joint disease involving multiple joints 7/23/2020    Diet-controlled type 2 diabetes mellitus ([facility] [address] 7/23/2020    Discharge of eye 7/23/2020    Essential hypertension 7/23/2020    Gastroesophageal reflux disease 7/23/2020    Long term current use of systemic steroids 7/23/2020    Mammogram abnormal 7/23/2020    Morbid obesity ([facility] [address] 7/23/2020    Motion sickness 7/23/2020    Neck pain 7/23/2020    Osteoarthritis of knee 7/23/2020    Osteoarthritis of multiple joints 7/23/2020    Pain in forearm 7/23/2020    Pain in joint of right shoulder 7/23/2020    Pain in right arm 7/23/2020    Polymyalgia rheumatica ([facility] [address] 7/23/2020    Pure hypercholesterolemia 2/2/2022    Screening for cholesterol level 7/23/2020    Screening for malignant neoplasm of breast 7/23/2020    Severe obesity ([facility] [address] 7/23/2020    Sleep apnea 7/23/2020    Spinal stenosis of lumbar region 7/23/2020    Vitamin D deficiency 7/23/2020    Wound of skin 7/23/2020     Past Surgical History:   Procedure Laterality Date    HX BREAST BIOPSY Right     benign    HX COLONOSCOPY      HX ORTHOPAEDIC Right 09/15/2021    carpal tunnel surgery of R hand    HX ORTHOPAEDIC Left 07/15/2021    carpal tunnel surgery of L hand     Social History     Socioeconomic History    Marital status:    Tobacco Use    Smoking status: Never Smoker    Smokeless tobacco: Never Used   Substance and Sexual Activity    Alcohol use: Not Currently    Drug use: Never     Family History   Problem Relation Age of Onset    Heart Disease Mother     Diabetes Mother     Stroke Mother     Heart Disease Father     Stroke Father     Lupus Sister      Current Outpatient Medications   Medication Sig Dispense Refill    lisinopril-hydroCHLOROthiazide (PRINZIDE, ZESTORETIC) 20-12.5 mg per tablet Take 1 Tablet by mouth Every morning. in the morning 90 Tablet 1    SITagliptin (JANUVIA) 50 mg tablet Take 1 Tablet by mouth Daily (before breakfast).  30 Tablet 2    fluticasone propionate (FLONASE) 50 mcg/actuation nasal spray Use 2 spray(s) in each nostril once daily 16 g 5    pregabalin (LYRICA) 150 mg capsule TAKE 1 CAPSULE BY MOUTH TWICE DAILY MAX  DAILY  AMOUNT  300   Capsule 1    cetirizine (ZYRTEC) 10 mg tablet Take 1 tablet by mouth once daily 90 Tab 1    aspirin delayed-release 81 mg tablet Take  by mouth daily.  azaTHIOprine (IMURAN) 50 mg tablet Take 50 mg by mouth two (2) times a day.  folic acid/multivit-min/lutein (CENTRUM SILVER PO) Take  by mouth.  diclofenac (VOLTAREN) 1 % gel Apply  to affected area four (4) times daily.  cholecalciferol (Vitamin D3) 25 mcg (1,000 unit) cap Take  by mouth daily. Allergies   Allergen Reactions    Bactrim [Sulfamethoprim] Itching       Objective:  Visit Vitals  BP (!) 126/58 (BP 1 Location: Right arm)   Pulse 65   Temp 97 °F (36.1 °C) (Temporal)   Resp 16   Ht 5' 1\" (1.549 m)   Wt 203 lb 3.2 oz (92.2 kg)   SpO2 100%   BMI 38.39 kg/m²       Physical Exam:   Constitutional: General Appearance: . Level of Distress: NAD. Psychiatric: Mental Status: normal mood and affect Orientation: to time, place, and person. ,normal eye contact. Head: Head: normocephalic and atraumatic. Eyes: Pupils: PERRLA. Sclerae: non-icteric. Neck: Neck: supple, trachea midline, and no masses. Lymph Nodes: no cervical LAD. Thyroid: no enlargement or nodules and non-tender. Lungs: Respiratory effort: no dyspnea. Auscultation: no wheezing, rales/crackles, or rhonchi and breath sounds normal and good air movement. Cardiovascular: Apical Impulse: not displaced. Heart Auscultation: normal S1 and S2; no murmurs, rubs, or gallops; and RRR. Neck vessels: no carotid bruits. Pulses including femoral / pedal: normal throughout. Abdomen: Bowel Sounds: normal. Inspection and Palpation: no tenderness, guarding, or masses and soft and non-distended. Liver: non-tender and no hepatomegaly. Spleen: non-tender and no splenomegaly. Musculoskeletal[de-identified] Extremities: no edema,no varicosities. No Calf tenderness. Neurologic: Gait and Station: normal gait and station. Motor Strength normal right and left. Skin: Inspection and palpation: no rash, lesions, or ulcer. Diabetic foot exam monofilament test is negative in both feet dorsalis pedis palpable in both feet. Results for orders placed or performed in visit on 06/14/22   AMB POC GLUCOSE BLOOD, BY GLUCOSE MONITORING DEVICE   Result Value Ref Range    Glucose  MG/DL   AMB POC HEMOGLOBIN A1C   Result Value Ref Range    Hemoglobin A1c (POC) 6.6 %       Assessment/Plan:      ICD-10-CM ICD-9-CM    1. Essential hypertension  I10 401.9 CBC WITH AUTOMATED DIFF      METABOLIC PANEL, COMPREHENSIVE   2. Controlled type 2 diabetes mellitus without complication, without long-term current use of insulin (HCC)  E11.9 250.00 AMB POC GLUCOSE BLOOD, BY GLUCOSE MONITORING DEVICE      AMB POC HEMOGLOBIN L1L      METABOLIC PANEL, COMPREHENSIVE      MICROALBUMIN, UR, RAND W/ MICROALB/CREAT RATIO       DIABETES FOOT EXAM      REFERRAL TO DIABETIC EDUCATION   3. Pure hypercholesterolemia  E78.00 272.0 LIPID PANEL   4. Polymyalgia rheumatica (HCC)  M35.3 725    5. Severe obesity (BMI 35.0-39. 9) with comorbidity ([facility] [address]  E66.01 278.01    6. Spinal stenosis of lumbar region, unspecified whether neurogenic claudication present  M48.061 724.02 REFERRAL TO ORTHOPEDICS   7. Allergic rhinitis, unspecified seasonality, unspecified trigger  J30.9 477.9    8. Sleep apnea, unspecified type  G47.30 780.57    9. Primary osteoarthritis involving multiple joints  M15.9 715.98 REFERRAL TO ORTHOPEDICS   10. Statin intolerance  Z78.9 995.27    11.  Need for hepatitis C screening test  Z11.59 V73.89 HEPATITIS C AB     Orders Placed This Encounter    CBC WITH AUTOMATED DIFF    METABOLIC PANEL, COMPREHENSIVE    LIPID PANEL    HEPATITIS C AB    MICROALBUMIN, UR, RAND W/ MICROALB/CREAT RATIO    REFERRAL TO ORTHOPEDICS Referral Priority:   Routine     Referral Type:   Consultation     Referral Reason:   Specialty Services Required     Referred to Provider:   Princess Hopkins MD     Number of Visits Requested:   1    REFERRAL TO DIABETIC EDUCATION     Referral Priority:   Routine     Referral Type:   Consultation     Referral Reason:   Specialty Services Required     Number of Visits Requested:   1    AMB POC GLUCOSE BLOOD, BY GLUCOSE MONITORING DEVICE    AMB POC HEMOGLOBIN A1C    HM DIABETES FOOT EXAM    SITagliptin (JANUVIA) 50 mg tablet     Sig: Take 1 Tablet by mouth Daily (before breakfast). Dispense:  30 Tablet     Refill:  2       Hypertension controlled. Continue lisinopril hydrochlorothiazide 20/12.5 mg once a day diet low in sodium. Type 2 diabetes mellitus hemoglobin A1c 6.6% in the office she takes metformin extended release 500 mg with her dinner which causes GI upset and she thinks that she will not tolerate twice a day so I stopped her metformin ER and started on Januvia 50 mg once a day before breakfast and explained the side effects and she will let me know continue walking minimum 30 minutes 5 days a week but she has severe backache and history of lumbar spinal stenosis so physical activity is limited. Diet low in starch sugar. I have referred her to diabetic education classes. Her fasting blood sugar is 118. Follow-up with ophthalmologist.    Diabetic monofilament test is negative in both feet. Sensation present and dorsalis pedis palpable in both feet. Lumbar spinal stenosis with history of sciatica getting pregabalin 150 mg twice a day. She does not have that much improvement problem is not worsening and not improving so I recommended her to take opinion from orthopedic spine surgeon and she had injection in her knee joint for osteoarthritis in knee joint and she used to see Dr. Mehdi Torres. Allergic rhinitis taking fluticasone nasal spray and cetirizine.     Polymyalgia rheumatica under care of rheumatologist Dr. Domenic Abdul and getting azathioprine monitor labs. Hypercholesteremia cannot take statin so labs ordered diet low in fat. Statin causes muscle pain. Continue vitamin D3 supplements. Morbid obesity healthy lifestyle options in diet and stay physically active as much as she can do. Referral given for orthopedic spine surgeon. Labs ordered. Refills given. Follow-up in 3 months. Answered all her questions. Appreciated. Referral given for diabetic education classes. lose weight, follow low fat diet, follow low salt diet, continue present plan, routine labs ordered, call if any problems    There are no Patient Instructions on file for this visit. Follow-up and Dispositions    · Return in about 3 months (around 9/14/2022) for follow up for chronic condition.

## 2022-07-29 ENCOUNTER — CLINICAL SUPPORT (OUTPATIENT)
Dept: DIABETES SERVICES | Age: 77
End: 2022-07-29
Payer: MEDICARE

## 2022-07-29 DIAGNOSIS — E11.9 CONTROLLED TYPE 2 DIABETES MELLITUS WITHOUT COMPLICATION, WITHOUT LONG-TERM CURRENT USE OF INSULIN (HCC): Primary | ICD-10-CM

## 2022-07-29 PROCEDURE — G0108 DIAB MANAGE TRN  PER INDIV: HCPCS | Performed by: DIETITIAN, REGISTERED

## 2022-07-29 NOTE — PROGRESS NOTES
[employer]  Diabetes Self-Management Education & Support Program    Reason for Referral: Diabetes Education  Referral Source: Real Viramontes MD  Services requested: DSMES       ASSESSMENT    From my perspective, the participant would benefit from [facility] specifically related to reducing risks, healthy eating, monitoring, taking medications, physical activity, healthy coping, and problem solving. Will adapt DSMES program to build on participant's strengths in preparedness score as noted in the Diabetes Skills, Confidence, and Preparedness Index. During the program, we will focus on providing DSMES that specifically addresses participant's interest in reducing risks, healthy eating, and healthy coping, as shown by their reported readiness to change. The participant would be best served by attending weekly group class series. Diabetes Self-Management Education Follow-up Visit: September 2022 Group Classes: Wednesday, [place]  Ms. Robert Pena reports helping with caring for her grandmother who had many complications from uncontrolled DM. She shared her experience with her grandmother having a hemiparalysis from a CVA and BL amputations. She had two older siblings that have been diagnosed with Type2 DM and both have passed away. Ms. Robert Pena shared that she has brought her A1C down from 6.7% to 6.5% on medication. She used to walk in the pool for at least one hour before the COVID-19 pandemic. She has the Silver Sneakers coverage from her insurance. She is not monitoring her BG at this time. Per 24 hour recall, she is consuming high CHO intakes. This RDN encouraged intake of 30-45 g CHO per meal and recommended that she visit Diabetes. org to learn more about designing the Healthy plate and learning about CHO sources. She was open to monitoring at least one time per week. Ms. Robert Pena has not started taking Januvia.  She reports not being aware that it had been ordered and received by the pharmacy. She is still taking Metformin ER. Clinical Presentation  Jhon Vera is a 68 y.o.  female referred for diabetes self-management education. Participant has Type 2 DM not on insulin for <1 year. Family history positivefor diabetes. Patient reports not receiving DSMES services in the past. Most recent A1c value:   Lab Results   Component Value Date/Time    Hemoglobin A1c 6.4 (H) 01/25/2022 10:56 AM    Hemoglobin A1c (POC) 6.6 06/14/2022 11:21 AM    Hemoglobin A1c, External 6.5 05/20/2019 12:00 AM       Diabetes-related medical history:    Diabetes-related medications:  Current dosing:   Key Antihyperglycemic Medications               SITagliptin (JANUVIA) 50 mg tablet Take 1 Tablet by mouth Daily (before breakfast). Blood Pressure Management  Key ACE/ARB Medications               lisinopril-hydroCHLOROthiazide (PRINZIDE, ZESTORETIC) 20-12.5 mg per tablet Take 1 Tablet by mouth Every morning. in the morning            Lipid Management  Key Antihyperlipidemia Meds       The patient is on no antihyperlipidemia meds. Clot Prevention  Key Anti-Platelet Anticoagulant Meds               aspirin delayed-release 81 mg tablet Take  by mouth daily. Learning Assessment  Learning objectives Educator assessment (7/29/2022)   Diabetes Disease Process  The participant can   A) describe diabetes in basic terms;   B) state the type of diabetes they have; &   C) state accepted blood glucose targets. Healthy Eating  The participant can   A) identify carbohydrate foods; &   B) accurately read food labels. Being Active  The participant can  A) state the benefits of physical activity;  B) report their current PA practices;  C) identify PA they would consider incorporating in their lives; &  D) develop an implementation plan.      Monitoring  The participant can  A) operate their blood glucose meter; &  B) describe how they log their blood glucoses to share with their provider. Taking Medications  The participant can  A) name their diabetes medications;  B) state the purpose and dose;  C) note side effects; &  D) describe proper storage, disposal & transport (if appropriate). Healthy Coping  The participant can    A) describe their response to diabetes diagnosis; B) describe their specific coping mechanisms;  C) identify supportive people and/or other resources that positively support their diabetes self-care and health. Reducing Risks  The participant can describe the preventive measures used by providers to promote health and prevent diabetes complications. Problem Solving  The participant can   A) identify signs, symptoms & treatment of hypoglycemia;    B) identify signs, symptoms & treatment of hyperglycemia;  C) describe their sick day plan; &  D) identify BG patterns to discuss with their provider.        Yes  Yes  No        Yes  No        No  Yes  Yes  Yes        No  No        Yes  Yes  Yes  Yes        Yes  Yes  Yes        Yes          No  No  No  No     Characteristics to Learning   Barriers to Learning      None     Favorite Ways to Learn   [] Lecture  [] Slides  [] Reading [] Video-Internet  [] Cassettes/CDs/MP3's  [x] Interactive Small Groups [] Other       Behavioral Assessment  Current self-care practices  Educator assessment (7/29/2022)   Healthy Eating   Current practices    24-hour Dietary Recall:  Breakfast: banana, orange juice, eggs x2 and link sausage  Lunch: none  Dinner: lasagna 1.5 cups (cheese and ground beef)  Snacks: corn chips ~8 oz  Beverages: orange juice, coffee, water  Alcohol: none       Would benefit from DSMES related to Healthy Eating: Yes    Eats a carbohydrate controlled diet: No    Stage of change: Contemplation      Being Active  Current practices  How many days during the past week have you performed physical activity where your heart beats faster and your breathing is harder than normal for 30 minutes or more?  0 day(s)    How many days in a typical week do you perform activity such as this?  0 day(s)     Would benefit from [facility] related to Being Active: Yes}      Exercises 150 minutes/week: Yes      Stage of change: Pre-contemplation     Monitoring  Current practices  Do you monitor your blood sugar? No    How often do you monitor? never    Do you know your last A1c measurement? Yes    Do you know the meaning of the A1c? No     Would benefit from [facility] related to Monitoring: Yes      Uses BG readings to establish trends and understand BG patterns: No      Stage of change: Pre-contemplation       Taking Medication  Current practices  Do you understand what your diabetes medications do? No    How often do you miss doses of your diabetes medications? never    Can you afford your diabetes medications? Yes   Would benefit from [facility] related to Taking Medication: Yes      Takes medications consistently to receive full benefit: No      Stage of change: Contemplation       Healthy Coping   Current state  Diabetes Skills, Confidence and Preparedness Index: Total score: 5.4  Skills Score: 4.7  Confidence Score: 5.9  Preparedness Score: 5.8       Would benefit from DSMES related to Healthy Coping: Yes      Identifies specific people, organizations,etc, that actively support their diabetes self-care efforts: Yes      Stage of change: Action     Reducing Risks  Current state  Vaccines:  Influenza:   Immunization History   Administered Date(s) Administered    Influenza Vaccine 09/26/2018       Pneumococcal:   Immunization History   Administered Date(s) Administered    Pneumococcal Polysaccharide (PPSV-23) 12/09/2019        Hepatitis: There is no immunization history for the selected administration types on file for this patient.     Examinations:  Diabetic Foot and Eye Exam HM Status   Topic Date Due    Eye Exam  01/19/2023    Diabetic Foot Care  06/14/2023        Dental exam: due    Foot exam: done on: June 2023    Heart Protection:  BP Readings from Last 2 Encounters:   06/14/22 (!) 126/58   02/02/22 (!) 140/62        Lab Results   Component Value Date/Time    LDL, calculated 129 (H) 01/25/2022 10:56 AM        Kidney Protection:  No results found for: MCACR, MCA1, MCA2, MCA3, MCAU, MCAU2, MCALPOCT     Would benefit from [facility] related to Reducing Risks: Yes      Actively participates in decision-making with provider regarding secondary prevention:  Yes      Stage of change: Action   Problem Solving  Current state  Hypoglycemia Management:  What are signs and symptoms of hypoglycemia that you experience: Pt reported being unaware of s/s of hypoglycemia    How do you prevent hypoglycemia: patient is unaware of how to treat low blood sugars    How do you treat hypoglycemia: Patient is unaware of how to treat hypoglycemia    Hyperglycemia Management:  What are signs and symptoms of hyperglycemia that you experience: Pt reported being unaware of s/s of hyperglycemia    How can you prevent hyperglycemia: patient is unaware of how to prevent high blood sugars    Sick Day Management:  What do you do differently on sick days:  Pt reported being unaware of self-management on sick days    Pattern Management:  Do you notice blood glucose patterns when you look at the readings in your meter or logbook? No    How do you use the blood glucose readings from your meter or logbook?   Not monitoring     Would benefit from [facility] related to Problem Solving: Yes      Articulates appropriate strategies to address hypoglycemia, hyperglycemia, sick day care and BG pattern: Yes      Stage of change: Pre-contemplation       Note: Content derived from the American Association of Diabetes Educators' Diabetes Education Curriculum: A Guide to Successful Self-Management (3rd edition)      Arlys Libman, RD on 7/29/2022 at 1:17 PM    I have personally reviewed the health record, including provider notes, laboratory data and current medications before making these care and education recommendations. The time spent in this effort is included in the total time. Total minutes: 30    Overall SCPI score: 5.4   Skills Score: 4.7  Confidence Score: 5.9  Preparedness Score: 5.8      Skills/Knowledge Questions  1. I know how to plan meals that have the best balance between carbohydrates, proteins and vegetables. 6  2. I know how my diabetes medications (pills, injectables and/or insulin) work in my body. 6  3. I know when to check my blood sugar if I want to see how my body responded to a meal. 2  4. I know when to check my blood sugars to determine if my medication or insulin doses are correct. 6  5. I know what to do to prevent a low blood sugar when I exercise (either before, during, or after). 2  6. When I am sick, I know what to do differently with my diabetes management. 2  7. I know how stress can affect my diabetes management. 6  8. When I look at my blood sugars over a given week, I can explain what my blood sugar pattern is. 6  9. I know what my target levels are for A1c, blood pressure and cholesterol. 6  Confidence Questions  1. I am confident that I can plan balanced meals and snacks. 6  2. I am confident that I can manage my stress. 6  3. I am confident that I can prevent a low blood sugar during or after exercise. 6  4. I am confident that the next time I eat out, I will be able to choose foods that best keep my blood sugars in target. 6  5. I am confident I can include exercise into my schedule. 6  6. I am confident that I can use my daily blood sugars to adjust my diet, my activity, and/or my insulin. 6  7. When something out of my normal routine happens, I am confident that I can problem-solve and keep my diabetes on track. 5  Preparedness Questions  1. Within the next month, I will begin to eat more balanced meals and snacks. 6  2. Within the next month, I will choose an exercise activity and I will start fitting it into my schedule. 6  3.  Within the next month, I will make a list of stress management options that work for me. 5  4. Within the next month, I will consistently plan ahead to prevent low blood sugars. 6  5. Within the next month, I will start adjusting my insulin doses on my own. 0  6. Within the next month, I will begin making changes to my diabetes management based on my daily blood sugars (eg - eating, activity and/or insulin). 6  7. Within the next month, I will begin making changes to my diabetes management to meet my overall goals (eg - eating, activity and/or insulin).  6

## 2022-08-03 ENCOUNTER — CLINICAL SUPPORT (OUTPATIENT)
Dept: DIABETES SERVICES | Age: 77
End: 2022-08-03
Payer: MEDICARE

## 2022-08-03 DIAGNOSIS — E11.9 CONTROLLED TYPE 2 DIABETES MELLITUS WITHOUT COMPLICATION, WITHOUT LONG-TERM CURRENT USE OF INSULIN (HCC): Primary | ICD-10-CM

## 2022-08-03 PROCEDURE — G0109 DIAB MANAGE TRN IND/GROUP: HCPCS

## 2022-08-05 NOTE — PROGRESS NOTES
[employer] [facility] for Diabetes Health  Diabetes Self-Management Education & Support Program  Encounter Note    SUMMARY  Diabetes self-care management training was completed related to reducing risks. he participant will return on August 10 to continue DSMES related to healthy eating and monitoring. The participant did identify SMART Goal(s) and will practice knowledge and skills related to reducing risks to improve diabetes self-management. EVALUATION:  Participant shared that her  A1c has gone up since she stopped exercising. States that she was doing water aerobics regularly, but since COVID she has not been going. She is going to be starting back next week. States that she understands now how physical activity is important in the management of her diabetes. She is anxious to take the classes and obtain the information she needs to manage diabetes well. RECOMMENDATIONS:  Continue DSMES classes     TOPICS DISCUSSED TODAY:  WHAT IS DIABETES? Minutes: Ewa Elizondo 58? 60      Next provider visit is scheduled for August 10, 2022       SMART GOAL(S)  Create a personal care record to keep track of my self-care routine. ACHIEVEMENT OF GOAL(S) : 0-24%         DATE DSMES TOPIC EVALUATION     8/4/2022 WHAT IS DIABETES? Role of the normal pancreas in energy balance and blood glucose control   The defect seen in diabetes   Signs & symptoms of diabetes   Diagnosis of diabetes   Types of diabetes   Blood glucose targets in non-pregnant & non-pregnant adults       The participant knows  Their type of diabetes: Yes  The basic physiologic defect: Yes  Blood glucose targets: Yes     DATE DSMES TOPIC EVALUATION     8/4/2022 HOW DO I STAY HEALTHY?    Prevention   Vaccinations   Preconception care (if applicable)  Examinations   Eye    Foot   Diabetic complications' prevention   Dental health   Heart health   Kidney Health   Nerve health   Sleep health      The participant has a personal diabetes care record to keep abreast of diabetes health Yes                  Alina Cano RN on 8/4/2022 at 8:53 PM    I have personally reviewed the health record, including provider notes, laboratory data and current medications before making these care and education recommendations. The time spent in this effort is included in the total time.   Total minutes: 120

## 2022-08-05 NOTE — PROGRESS NOTES
[facility] for Diabetes Health  Diabetes Self-Management Education & Support Program  Encounter Note      SMART GOAL(S)   Practice self care by scheduling a diabetic foot exam and dental appointment by next week.   ACHIEVEMENT OF GOAL(S) : 0-24%         Magalys Harrington RD on 8/5/2022 at 3:51 PM

## 2022-08-10 ENCOUNTER — CLINICAL SUPPORT (OUTPATIENT)
Dept: DIABETES SERVICES | Age: 77
End: 2022-08-10
Payer: MEDICARE

## 2022-08-10 DIAGNOSIS — E11.9 CONTROLLED TYPE 2 DIABETES MELLITUS WITHOUT COMPLICATION, WITHOUT LONG-TERM CURRENT USE OF INSULIN (HCC): Primary | ICD-10-CM

## 2022-08-10 PROCEDURE — G0109 DIAB MANAGE TRN IND/GROUP: HCPCS | Performed by: DIETITIAN, REGISTERED

## 2022-08-11 NOTE — PROGRESS NOTES
[address] [place] [address] for Diabetes Health  Diabetes Self-Management Education & Support Program  Encounter Note    SUMMARY  Diabetes self-care management training was completed related to healthy eating and monitoring. The participant will return on August 17 to continue DSMES related to taking medications and physical activity. The participant did identify SMART Goal(s) and will practice knowledge and skills related to reducing risks and healthy eating and monitoring to improve diabetes self-management. EVALUATION:  Ms. Yoly Steele participated in group discussions and activities. She demonstrated good understanding of CHO counting using food models, label reading and the healthy plate platform. She is not monitoring her BG levels. She admits to purchasing a meter, but she has not started monitoring. She is scheduled for a diabetic foot exam in September. RECOMMENDATIONS:  Use CHO counting skills/label reading/healthy plate format to design 45 g CHO meals - Consistently for B/L/D   Start monitoring her BG levels at least daily: Fasting Blood Glucose    TOPICS DISCUSSED TODAY:  WHAT CAN I EAT? 60  HOW CAN BLOOD GLUCOSE MONITORING HELP ME? 60      Next provider visit is scheduled for 9/6/2022 c Podiatry       SMART GOAL(S)  Practice building a balanced meal for 1 at least 7 times over the next week. ACHIEVEMENT OF GOAL(S) : 0-24%         DATE DSMES TOPIC EVALUATION     8/11/2022 WHAT CAN I EAT? General principles   Determining a healthy weight   Nutritional terms & tools   Healthy Plate method   Carbohydrate Counting   Reading food labels   Free apps   Pregnancy recommendations      The participant   Uses Healthy Plate principles in constructing meals: Yes  Reads food labels in choosing acceptable foods: Yes    The participant needs to address eating three 45 g CHO meals at least 4-5 hours apart daily. DATE DSMES TOPIC EVALUATION     8/11/2022 HOW CAN BLOOD GLUCOSE MONITORING HELP ME?    Value of blood glucose monitoring   Realistic expectations   Blood glucose monitoring targets   Target adjustments   Setting a1c & blood glucose targets with provider   Meter selection    Technique for obtaining blood droplet   Blood glucose testing sites   Determining best times to test   Pregnancy recommendations   Data sharing with provider        The participant   Can demonstrate their glucometer procedure: No  Logs their BG readings:  No    The participant needs to address monitoring her BG levels. Marget Goldberg, RD on 8/11/2022 at 4:34 PM    I have personally reviewed the health record, including provider notes, laboratory data and current medications before making these care and education recommendations. The time spent in this effort is included in the total time.   Total minutes: 120

## 2022-08-17 ENCOUNTER — CLINICAL SUPPORT (OUTPATIENT)
Dept: DIABETES SERVICES | Age: 77
End: 2022-08-17

## 2022-08-17 DIAGNOSIS — E11.9 CONTROLLED TYPE 2 DIABETES MELLITUS WITHOUT COMPLICATION, WITHOUT LONG-TERM CURRENT USE OF INSULIN (HCC): Primary | ICD-10-CM

## 2022-08-17 PROCEDURE — G0109 DIAB MANAGE TRN IND/GROUP: HCPCS

## 2022-08-17 NOTE — PROGRESS NOTES
[facility] for Diabetes Health  Diabetes Self-Management Education & Support Program  Encounter Note    SUMMARY  Diabetes self-care management training was completed related to monitoring, taking medications, and physical activity. he participant will return on August 24 to continue DSMES related to healthy coping and problem solving. The participant did identify SMART Goal(s) and will practice knowledge and skills related to reducing risks, healthy eating and monitoring, and being active and medications to improve diabetes self-management. EVALUATION:  Participant states that she has not been monitoring her BG levels. She tested her mothers levels for years and is resistant to checking her own. She did bring both of her meters to class today and was able to demonstrate proper technique in monitoring. She had a lot of encouragement and praise from her classmates. Participant states that she is going to try and start monitoring regularly. Her provider has asked her to check her BG 1 time per day in the am.  She was able to participate in physical activity during class. RECOMMENDATIONS:  Start monitoring BG levels regularly in the am.  Log meals/ BG levels for provider. Continue DSMES classes. TOPICS DISCUSSED TODAY:  Monitoring 30 min  Medications 30 min  Physical Activity 60        Next provider visit is scheduled for August 24, 2022       SMART GOAL(S)  Check blood glucose 1 times per day over the next week. ACHIEVEMENT OF GOAL(S) : 0-24%             DATE DSMES TOPIC EVALUATION     8/17/2022 HOW CAN BLOOD GLUCOSE MONITORING HELP ME?    Value of blood glucose monitoring   Realistic expectations   Blood glucose monitoring targets   Target adjustments   Setting a1c & blood glucose targets with provider   Meter selection    Technique for obtaining blood droplet   Blood glucose testing sites   Determining best times to test   Pregnancy recommendations   Data sharing with provider        The participant   Can demonstrate their glucometer procedure: Yes  Logs their BG readings:  Yes       DATE DSMES TOPIC EVALUATION     8/17/2022 HOW DO MY DIABETES MEDICATIONS WORK? Type 1 medications & methods   Insulin injections   Injection sites   Type 2 medications   Oral agents   GLP-1 agonists   Hypoglycemia symptoms & treatment   Glucagon emergency kits   General guidance regarding insulin use whether Type 1, 2 or gestational diabetes   Storage of insulin   Disposal    Traveling with medications   Barriers to medication adherence      The participant   Can describe the expected action & side effects of prescribed diabetes medications: Yes  Can demonstrate injection technique (if applicable): Yes         DATE DSMES TOPIC EVALUATION     8/17/2022 HOW DOES PHYSICAL ACTIVITY AFFECT MY DIABETES? Benefits of physical activity   Beginning a program of physical activity   Walking   Pedometers   Goal setting   Structured physical activity program   Aerobic activity   Resistance   Flexibility   Balance   Physical activity program progression   Safety issues   Barriers to physical activity   Facilitators of physical activity        The participant has established a regular physical activity plan: Sara Medel RN on 8/17/2022 at 3:55 PM    I have personally reviewed the health record, including provider notes, laboratory data and current medications before making these care and education recommendations. The time spent in this effort is included in the total time.   Total minutes: 120

## 2022-08-23 ENCOUNTER — TELEPHONE (OUTPATIENT)
Dept: INTERNAL MEDICINE CLINIC | Age: 77
End: 2022-08-23

## 2022-08-23 RX ORDER — METFORMIN HYDROCHLORIDE 500 MG/1
TABLET ORAL
Qty: 90 TABLET | Refills: 1 | Status: SHIPPED | OUTPATIENT
Start: 2022-08-23

## 2022-08-23 NOTE — TELEPHONE ENCOUNTER
Pt called asked for a refill for Metformin. The Rx that you changed her to she says is to expensive.

## 2022-08-24 ENCOUNTER — CLINICAL SUPPORT (OUTPATIENT)
Dept: DIABETES SERVICES | Age: 77
End: 2022-08-24
Payer: MEDICARE

## 2022-08-24 DIAGNOSIS — E11.9 CONTROLLED TYPE 2 DIABETES MELLITUS WITHOUT COMPLICATION, WITHOUT LONG-TERM CURRENT USE OF INSULIN (HCC): Primary | ICD-10-CM

## 2022-08-24 PROCEDURE — G0109 DIAB MANAGE TRN IND/GROUP: HCPCS

## 2022-08-24 NOTE — PROGRESS NOTES
[facility] for Diabetes Health  Diabetes Self-Management Education & Support Program  Encounter Note    SUMMARY  Diabetes self-care management training was completed related to healthy coping and problem solving. he participant will return in 6 weeks for follow up related to reducing risks, healthy eating, monitoring, taking medications, physical activity, healthy coping, and problem solving. The participant did not identify SMART Goal(s) and will practice knowledge and skills related to reducing risks, healthy eating and monitoring, being active and medications, and healthy coping and problem solving to improve diabetes self-management. EVALUATION:  Participant shared that she felt frightened when she found out she had diabetes. She now feels that she is better prepared to manage her diabetes to prevent complications. She feels that she needs to exercise more and was able to problem solve physical activity. RECOMMENDATIONS:  Continue using skills obtained in DSMES classes     TOPICS DISCUSSED TODAY:  HOW DO I FIND SUPPORT TO TACKLE THIS CONDITION? 60  HOW DO I FIGURE OUT WHAT'S INFLUENCING MY BLOOD GLUCOSES? 60      Next provider visit is scheduled for 6 weeks         DATE DSMES TOPIC EVALUATION     8/24/2022 HOW DO I FIND SUPPORT TO TACKLE THIS CONDITION?    Normal responses to diabetes diagnosis or complication   Shock   Anger & resentment   Guilt/self-blame   Sadness & worry   Depression    Anxiety   Pregnancy   Constructive strategies to normal responses    Exploring feelings & attitudes   Motivation: Cost versus benefits analysis   Problem-solving: Chain analysis   Obtaining support: Communicating   Family & friends   Health team   Community   Stress   Symptoms   Managing stress   Fill your tool kit        The participant can identify people that support them in caring for their diabetes health: patient, spouse, and brother      The participant would like to pursue the following in keeping themselves healthy after completing the program:  Diabetes [employer]         DATE DSMES TOPIC EVALUATION     8/24/2022 HOW DO I FIGURE OUT WHAT'S INFLUENCING MY BLOOD GLUCOSES? Problem solving using SOAR   Set goals   Sort options   Arrive at a plan   Reaffirm plan   Common problems in diabetes self-care   Hypoglycemia   Hyperglycemia   Sick days   Pattern management   Disaster preparedness plan        The participant has an action plan for   Hypoglycemia: Yes  Hyperglycemia: Yes  Sick days: Yes  During disasters: Yes           Kelly Simpson RN on 8/24/2022 at 4:24 PM    I have personally reviewed the health record, including provider notes, laboratory data and current medications before making these care and education recommendations. The time spent in this effort is included in the total time.   Total minutes: 120

## 2022-09-06 ENCOUNTER — OFFICE VISIT (OUTPATIENT)
Dept: PODIATRY | Age: 77
End: 2022-09-06
Payer: MEDICARE

## 2022-09-06 VITALS
HEIGHT: 61 IN | BODY MASS INDEX: 38.39 KG/M2 | SYSTOLIC BLOOD PRESSURE: 123 MMHG | OXYGEN SATURATION: 96 % | TEMPERATURE: 96.7 F | DIASTOLIC BLOOD PRESSURE: 80 MMHG | HEART RATE: 73 BPM

## 2022-09-06 DIAGNOSIS — E11.9 CONTROLLED TYPE 2 DIABETES MELLITUS WITHOUT COMPLICATION, WITHOUT LONG-TERM CURRENT USE OF INSULIN (HCC): Primary | ICD-10-CM

## 2022-09-06 DIAGNOSIS — B35.3 TINEA PEDIS OF BOTH FEET: ICD-10-CM

## 2022-09-06 DIAGNOSIS — L60.3 ONYCHODYSTROPHY: ICD-10-CM

## 2022-09-06 PROCEDURE — 11721 DEBRIDE NAIL 6 OR MORE: CPT | Performed by: PODIATRIST

## 2022-09-06 PROCEDURE — G8427 DOCREV CUR MEDS BY ELIG CLIN: HCPCS | Performed by: PODIATRIST

## 2022-09-06 PROCEDURE — G8399 PT W/DXA RESULTS DOCUMENT: HCPCS | Performed by: PODIATRIST

## 2022-09-06 PROCEDURE — 3044F HG A1C LEVEL LT 7.0%: CPT | Performed by: PODIATRIST

## 2022-09-06 PROCEDURE — G8754 DIAS BP LESS 90: HCPCS | Performed by: PODIATRIST

## 2022-09-06 PROCEDURE — 1123F ACP DISCUSS/DSCN MKR DOCD: CPT | Performed by: PODIATRIST

## 2022-09-06 PROCEDURE — G8417 CALC BMI ABV UP PARAM F/U: HCPCS | Performed by: PODIATRIST

## 2022-09-06 PROCEDURE — G8536 NO DOC ELDER MAL SCRN: HCPCS | Performed by: PODIATRIST

## 2022-09-06 PROCEDURE — 1101F PT FALLS ASSESS-DOCD LE1/YR: CPT | Performed by: PODIATRIST

## 2022-09-06 PROCEDURE — G8752 SYS BP LESS 140: HCPCS | Performed by: PODIATRIST

## 2022-09-06 PROCEDURE — 99203 OFFICE O/P NEW LOW 30 MIN: CPT | Performed by: PODIATRIST

## 2022-09-06 PROCEDURE — G8432 DEP SCR NOT DOC, RNG: HCPCS | Performed by: PODIATRIST

## 2022-09-06 PROCEDURE — 1090F PRES/ABSN URINE INCON ASSESS: CPT | Performed by: PODIATRIST

## 2022-09-06 RX ORDER — CHLORPHENIRAMINE MALEATE 4 MG
TABLET ORAL 2 TIMES DAILY
Qty: 85 G | Refills: 2 | Status: SHIPPED | OUTPATIENT
Start: 2022-09-06 | End: 2022-09-23 | Stop reason: ALTCHOICE

## 2022-09-06 NOTE — PROGRESS NOTES
1. \"Have you been to the ER, urgent care clinic since your last visit? Hospitalized since your last visit? \" No    2. \"Have you seen or consulted any other health care providers outside of the [address] [place] [address] since your last visit? \" No     3. For patients aged 39-70: Has the patient had a colonoscopy / FIT/ Cologuard? Yes - no Care Gap present      If the patient is female:    4. For patients aged 41-77: Has the patient had a mammogram within the past 2 years? Yes - no Care Gap present      5. For patients aged 21-65: Has the patient had a pap smear?  Yes - no Care Gap present    Chief Complaint   Patient presents with    Diabetic Foot Exam

## 2022-09-06 NOTE — PROGRESS NOTES
[facility]    Subjective:         Patient is a 68 y.o. female who is being seen as a new pt for diabetic pedal exam.  Patient states she has close follow-up with her PCP Marcus Spann MD). She states her last office visit with her PCP was 06/14/2022. She describes her diabetes as being under control, noting her last hemoglobin A1c was 6.6%. She denies any overt pedal pain. She denies any recent pedal trauma. She denies any systemic signs of infection but does state her nails are thick/discolored and she states she has a rash, recalcitrant to OTC meds.   She denies any other lower extremity complaints      Past Medical History:   Diagnosis Date    Bronchitis     Chronic sciatica 7/23/2020    Degenerative joint disease involving multiple joints 7/23/2020    Diet-controlled type 2 diabetes mellitus ([address] 7/23/2020    Discharge of eye 7/23/2020    Essential hypertension 7/23/2020    Gastroesophageal reflux disease 7/23/2020    Long term current use of systemic steroids 7/23/2020    Mammogram abnormal 7/23/2020    Morbid obesity ([address] 7/23/2020    Motion sickness 7/23/2020    Neck pain 7/23/2020    Osteoarthritis of knee 7/23/2020    Osteoarthritis of multiple joints 7/23/2020    Pain in forearm 7/23/2020    Pain in joint of right shoulder 7/23/2020    Pain in right arm 7/23/2020    Polymyalgia rheumatica ([address] 7/23/2020    Pure hypercholesterolemia 2/2/2022    Screening for cholesterol level 7/23/2020    Screening for malignant neoplasm of breast 7/23/2020    Severe obesity ([address] 7/23/2020    Sleep apnea 7/23/2020    Spinal stenosis of lumbar region 7/23/2020    Vitamin D deficiency 7/23/2020    Wound of skin 7/23/2020     Past Surgical History:   Procedure Laterality Date    HX BREAST BIOPSY Right     benign    HX COLONOSCOPY      HX ORTHOPAEDIC Right 09/15/2021    carpal tunnel surgery of R hand    HX ORTHOPAEDIC Left 07/15/2021    carpal tunnel surgery of L hand       Family History Problem Relation Age of Onset    Heart Disease Mother     Diabetes Mother     Stroke Mother     Heart Disease Father     Stroke Father     Lupus Sister       Social History     Tobacco Use    Smoking status: Never    Smokeless tobacco: Never   Substance Use Topics    Alcohol use: Not Currently     Allergies   Allergen Reactions    Bactrim [Sulfamethoprim] Itching     Prior to Admission medications    Medication Sig Start Date End Date Taking? Authorizing Provider   ofloxacin (FLOXIN) 0.3 % ophthalmic solution Administer 3 Drops to both eyes four (4) times daily. Provider, Historical   ezetimibe (ZETIA) 10 mg tablet Take 1 Tablet by mouth daily. 9/23/22   Nasra Win MD   metFORMIN (GLUCOPHAGE) 500 mg tablet Take 1 pill once a day with a large meal 8/23/22   Nasra Win MD   pregabalin (LYRICA) 150 mg capsule Take 1 Capsule by mouth two (2) times a day. Max Daily Amount: 300 mg. 8/22/22   Nasra Win MD   lisinopril-hydroCHLOROthiazide (PRINZIDE, ZESTORETIC) 20-12.5 mg per tablet Take 1 Tablet by mouth Every morning. in the morning 6/14/22   Nasra Win MD   fluticasone propionate [facility]) 50 mcg/actuation nasal spray Use 2 spray(s) in each nostril once daily 4/19/22   Nasra Win MD   cetirizine (ZYRTEC) 10 mg tablet Take 1 tablet by mouth once daily 4/20/21   Nasra Win MD   aspirin delayed-release 81 mg tablet Take  by mouth daily. Provider, Historical   azaTHIOprine (IMURAN) 50 mg tablet Take 50 mg by mouth two (2) times a day. Provider, Historical   folic acid/multivit-min/lutein (CENTRUM SILVER PO) Take  by mouth. Provider, Historical   diclofenac (VOLTAREN) 1 % gel Apply  to affected area four (4) times daily. Provider, Historical   cholecalciferol (VITAMIN D3) 25 mcg (1,000 unit) cap Take  by mouth daily. Provider, Historical       Review of Systems   Constitutional: Negative. HENT: Negative. Eyes: Negative. Respiratory:  Positive for apnea. Cardiovascular: Negative. Gastrointestinal: Negative. Endocrine: Negative. Genitourinary: Negative. Musculoskeletal:  Positive for arthralgias and back pain. Skin: Negative. Allergic/Immunologic: Positive for environmental allergies and immunocompromised state. Neurological: Negative. Hematological: Negative. Psychiatric/Behavioral: Negative. All other systems reviewed and are negative. Objective:     Visit Vitals  /80 (BP 1 Location: Right upper arm, BP Patient Position: Sitting, BP Cuff Size: Large adult)   Pulse 73   Temp (!) 96.7 °F (35.9 °C) (Temporal)   Ht 5' 1\" (1.549 m)   SpO2 96%   BMI 38.39 kg/m²       Physical Exam  Vitals reviewed. Constitutional:       Appearance: She is morbidly obese. Cardiovascular:      Pulses:           Dorsalis pedis pulses are 2+ on the right side and 2+ on the left side. Posterior tibial pulses are 2+ on the right side and 2+ on the left side. Pulmonary:      Effort: Pulmonary effort is normal.   Musculoskeletal:      Right lower leg: No edema. Left lower leg: No edema. Right foot: Normal range of motion. No deformity or bunion. Left foot: Normal range of motion. No deformity or bunion. Feet:      Right foot:      Protective Sensation: 10 sites tested. 10 sites sensed. Skin integrity: Dry skin present. Toenail Condition: Right toenails are abnormally thick. Left foot:      Protective Sensation: 10 sites tested. Skin integrity: Dry skin present. Toenail Condition: Left toenails are abnormally thick. Lymphadenopathy:      Lower Body: No right inguinal adenopathy. No left inguinal adenopathy. Skin:     General: Skin is warm. Capillary Refill: Capillary refill takes 2 to 3 seconds. Findings: Rash present. Comments: Absent pedal hair growth with hyperpigmentation   Neurological:      Mental Status: She is alert and oriented to person, place, and time.       Comments: Paresthesias/cold feeling in feet noted   Psychiatric:         Mood and Affect: Mood and affect normal.         Behavior: Behavior is cooperative. Data Review: No results found for this or any previous visit (from the past 24 hour(s)). Impression:       ICD-10-CM ICD-9-CM    1. Controlled type 2 diabetes mellitus without complication, without long-term current use of insulin (HCC)  E11.9 250.00       2. Tinea pedis of both feet  B35.3 110.4       3. Onychodystrophy  L60.3 703.8           Recommendation:     Patient seen and evaluated in the office  Discussed and educated patient regarding his/her current medical condition as it pertains to his/her diabetes and his/her thick/discolored toenails. Instructed patient to monitor his/her feet daily for possible wound formation, be compliant with all medications and wear supportive shoe gear for wound prevention. Reviewed and discussed most recent lab work, specifically as it pertains to his/her diabetes. Pt verbalized understanding of all discussed and reviewed   A sharp toenail plate debridement was performed to all 10 pedal digits with a nail nipper without incident. Patient tolerated well no dressing was needed  A prescription was provided for a topical antifungal cream to be applied twice daily to all affected skin for symptomatic relief          Paco Lucas, [facility], [address] [place] [address] and [facility]  [address] [place] [address]  [address], [address] [place] [address]  O: [phone]  F: [phone]  C: [phone]

## 2022-09-21 LAB
ALBUMIN SERPL-MCNC: 4.4 G/DL (ref 3.7–4.7)
ALBUMIN/CREAT UR: 7 MG/G CREAT (ref 0–29)
ALBUMIN/GLOB SERPL: 1.8 {RATIO} (ref 1.2–2.2)
ALP SERPL-CCNC: 89 IU/L (ref 44–121)
ALT SERPL-CCNC: 18 IU/L (ref 0–32)
AST SERPL-CCNC: 24 IU/L (ref 0–40)
BASOPHILS # BLD AUTO: 0 X10E3/UL (ref 0–0.2)
BASOPHILS NFR BLD AUTO: 0 %
BILIRUB SERPL-MCNC: 0.3 MG/DL (ref 0–1.2)
BUN SERPL-MCNC: 17 MG/DL (ref 8–27)
BUN/CREAT SERPL: 19 (ref 12–28)
CALCIUM SERPL-MCNC: 9.7 MG/DL (ref 8.7–10.3)
CHLORIDE SERPL-SCNC: 103 MMOL/L (ref 96–106)
CHOLEST SERPL-MCNC: 225 MG/DL (ref 100–199)
CO2 SERPL-SCNC: 26 MMOL/L (ref 20–29)
CREAT SERPL-MCNC: 0.91 MG/DL (ref 0.57–1)
CREAT UR-MCNC: 140 MG/DL
EGFR: 65 ML/MIN/1.73
EOSINOPHIL # BLD AUTO: 0.2 X10E3/UL (ref 0–0.4)
EOSINOPHIL NFR BLD AUTO: 3 %
ERYTHROCYTE [DISTWIDTH] IN BLOOD BY AUTOMATED COUNT: 14.9 % (ref 11.7–15.4)
GLOBULIN SER CALC-MCNC: 2.5 G/DL (ref 1.5–4.5)
GLUCOSE SERPL-MCNC: 109 MG/DL (ref 65–99)
HCT VFR BLD AUTO: 36.8 % (ref 34–46.6)
HCV AB S/CO SERPL IA: <0.1 S/CO RATIO (ref 0–0.9)
HDLC SERPL-MCNC: 79 MG/DL
HGB BLD-MCNC: 11.1 G/DL (ref 11.1–15.9)
IMM GRANULOCYTES # BLD AUTO: 0 X10E3/UL (ref 0–0.1)
IMM GRANULOCYTES NFR BLD AUTO: 0 %
LDLC SERPL CALC-MCNC: 129 MG/DL (ref 0–99)
LYMPHOCYTES # BLD AUTO: 1.6 X10E3/UL (ref 0.7–3.1)
LYMPHOCYTES NFR BLD AUTO: 32 %
MCH RBC QN AUTO: 25.3 PG (ref 26.6–33)
MCHC RBC AUTO-ENTMCNC: 30.2 G/DL (ref 31.5–35.7)
MCV RBC AUTO: 84 FL (ref 79–97)
MICROALBUMIN UR-MCNC: 10.2 UG/ML
MONOCYTES # BLD AUTO: 0.5 X10E3/UL (ref 0.1–0.9)
MONOCYTES NFR BLD AUTO: 10 %
NEUTROPHILS # BLD AUTO: 2.7 X10E3/UL (ref 1.4–7)
NEUTROPHILS NFR BLD AUTO: 55 %
PLATELET # BLD AUTO: 285 X10E3/UL (ref 150–450)
POTASSIUM SERPL-SCNC: 4.2 MMOL/L (ref 3.5–5.2)
PROT SERPL-MCNC: 6.9 G/DL (ref 6–8.5)
RBC # BLD AUTO: 4.39 X10E6/UL (ref 3.77–5.28)
SODIUM SERPL-SCNC: 144 MMOL/L (ref 134–144)
TRIGL SERPL-MCNC: 100 MG/DL (ref 0–149)
VLDLC SERPL CALC-MCNC: 17 MG/DL (ref 5–40)
WBC # BLD AUTO: 5 X10E3/UL (ref 3.4–10.8)

## 2022-09-21 NOTE — PROGRESS NOTES
Please call Ms. Chinchilla    Her complete blood count is almost normal    Blood sugar, kidney function and potassium electrolytes liver enzymes are normal    Her cholesterol is up and she cannot take statin causing muscle pain please ask her whether she wants to try another medicine called Zetia 10 mg/day or not please let me know    Screening for hep C negative    There is no leakage of protein in the urine.

## 2022-09-23 ENCOUNTER — OFFICE VISIT (OUTPATIENT)
Dept: INTERNAL MEDICINE CLINIC | Age: 77
End: 2022-09-23
Payer: MEDICARE

## 2022-09-23 VITALS
HEIGHT: 61 IN | RESPIRATION RATE: 12 BRPM | HEART RATE: 72 BPM | OXYGEN SATURATION: 94 % | DIASTOLIC BLOOD PRESSURE: 70 MMHG | SYSTOLIC BLOOD PRESSURE: 138 MMHG | BODY MASS INDEX: 44.93 KG/M2 | WEIGHT: 238 LBS

## 2022-09-23 DIAGNOSIS — Z78.9 STATIN INTOLERANCE: ICD-10-CM

## 2022-09-23 DIAGNOSIS — M48.061 SPINAL STENOSIS OF LUMBAR REGION, UNSPECIFIED WHETHER NEUROGENIC CLAUDICATION PRESENT: ICD-10-CM

## 2022-09-23 DIAGNOSIS — Z11.59 NEED FOR HEPATITIS C SCREENING TEST: ICD-10-CM

## 2022-09-23 DIAGNOSIS — E78.00 PURE HYPERCHOLESTEROLEMIA: ICD-10-CM

## 2022-09-23 DIAGNOSIS — I10 ESSENTIAL HYPERTENSION: ICD-10-CM

## 2022-09-23 DIAGNOSIS — K21.9 GASTROESOPHAGEAL REFLUX DISEASE WITHOUT ESOPHAGITIS: ICD-10-CM

## 2022-09-23 DIAGNOSIS — E11.9 CONTROLLED TYPE 2 DIABETES MELLITUS WITHOUT COMPLICATION, WITHOUT LONG-TERM CURRENT USE OF INSULIN (HCC): ICD-10-CM

## 2022-09-23 DIAGNOSIS — M54.30 CHRONIC SCIATICA, UNSPECIFIED LATERALITY: ICD-10-CM

## 2022-09-23 DIAGNOSIS — J30.9 ALLERGIC RHINITIS, UNSPECIFIED SEASONALITY, UNSPECIFIED TRIGGER: ICD-10-CM

## 2022-09-23 DIAGNOSIS — E66.01 SEVERE OBESITY (BMI 35.0-39.9) WITH COMORBIDITY (HCC): ICD-10-CM

## 2022-09-23 DIAGNOSIS — M35.3 POLYMYALGIA RHEUMATICA (HCC): ICD-10-CM

## 2022-09-23 DIAGNOSIS — Z79.52 LONG TERM CURRENT USE OF SYSTEMIC STEROIDS: ICD-10-CM

## 2022-09-23 PROCEDURE — G8432 DEP SCR NOT DOC, RNG: HCPCS | Performed by: INTERNAL MEDICINE

## 2022-09-23 PROCEDURE — G8536 NO DOC ELDER MAL SCRN: HCPCS | Performed by: INTERNAL MEDICINE

## 2022-09-23 PROCEDURE — 99214 OFFICE O/P EST MOD 30 MIN: CPT | Performed by: INTERNAL MEDICINE

## 2022-09-23 PROCEDURE — G8399 PT W/DXA RESULTS DOCUMENT: HCPCS | Performed by: INTERNAL MEDICINE

## 2022-09-23 PROCEDURE — G8427 DOCREV CUR MEDS BY ELIG CLIN: HCPCS | Performed by: INTERNAL MEDICINE

## 2022-09-23 PROCEDURE — 1090F PRES/ABSN URINE INCON ASSESS: CPT | Performed by: INTERNAL MEDICINE

## 2022-09-23 PROCEDURE — G8417 CALC BMI ABV UP PARAM F/U: HCPCS | Performed by: INTERNAL MEDICINE

## 2022-09-23 PROCEDURE — G8754 DIAS BP LESS 90: HCPCS | Performed by: INTERNAL MEDICINE

## 2022-09-23 PROCEDURE — G8752 SYS BP LESS 140: HCPCS | Performed by: INTERNAL MEDICINE

## 2022-09-23 PROCEDURE — 1101F PT FALLS ASSESS-DOCD LE1/YR: CPT | Performed by: INTERNAL MEDICINE

## 2022-09-23 RX ORDER — EZETIMIBE 10 MG/1
10 TABLET ORAL DAILY
Qty: 90 TABLET | Refills: 1 | Status: SHIPPED | OUTPATIENT
Start: 2022-09-23

## 2022-09-23 RX ORDER — OFLOXACIN 3 MG/ML
3 SOLUTION/ DROPS OPHTHALMIC 4 TIMES DAILY
COMMUNITY

## 2022-09-23 NOTE — PROGRESS NOTES
Edson Mckeon (: 1945) is a 68 y.o. female, established patient, here for evaluation of the following chief complaint(s):  Follow Up Chronic Condition and Hypertension         ASSESSMENT/PLAN:  Below is the assessment and plan developed based on review of pertinent history, physical exam, labs, studies, and medications. 1. Essential hypertension  2. Controlled type 2 diabetes mellitus without complication, without long-term current use of insulin ([address]  3. Pure hypercholesterolemia  4. Polymyalgia rheumatica (HCC)  5. Spinal stenosis of lumbar region, unspecified whether neurogenic claudication present  6. Severe obesity (BMI 35.0-39. 9) with comorbidity (HCC)  7. Statin intolerance  8. Long term current use of systemic steroids  9. Gastroesophageal reflux disease without esophagitis  10. Chronic sciatica, unspecified laterality  11. Allergic rhinitis, unspecified seasonality, unspecified trigger  12. Need for hepatitis C screening test    Hypertension controlled. Continue current medications as per in the chart including lisinopril hydrochlorothiazide 20/12.5 mg once a day. Diet low in sodium. Hypercholesteremia she cannot tolerate statin she is intolerance to statin causing myalgia I had discussion that she agreed to try ezetimibe 10 mg/day and I sent the refills. Type 2 diabetes mellitus hemoglobin A1c 6.6% in the office. She has been attending diabetic education classes and she says she has helped her to change her diet. She is almost sedentary not doing much physical exercise having lumbar spinal stenosis. She is getting metformin 500 mg once a day does not want to increase to twice a day. Diet low in starch sugar if possible walking minimum 15 minutes to 30 minutes 5 days a week regular ophthalmologic checkup continue low-dose aspirin    Lumbar spinal stenosis and polymyalgia rheumatica she follows rheumatologist.  She is getting pregabalin and she says it is helping her.   She gets azathioprine/Imuran 50 mg twice a day by rheumatologist.    For chronic back pain with sciatica she is helped by being on pregabalin 150 mg twice a day and using diclofenac gel as needed recommended to take Tylenol if needed 650 mg once or twice a day. Continue folic acid and vitamin D3. Allergic rhinitis getting cetirizine and fluticasone nasal spray. Recently she had conjunctivitis she was [facility]    She has sometimes itching recommended to use Systane eyedrops for dry eyes and she can use ketotifen eyedrops having allergies which can cause itching. No discharge. Answered all her questions. Lab results explained to her. Refills given. Side effects discussed. She agreed to take age-appropriate preventive vaccinations as per the chart including Shingrix vaccine Tdap vaccine and flu vaccine and booster dose of COVID-vaccine. She has no depression. Return in about 3 months (around 12/23/2022) for diabetes, hypertension,cholesterol follow up. SUBJECTIVE/OBJECTIVE:    HPI:    Ms. Josephine Lopez came for regular follow-up. She is pleasant personality. She has done her labs on 28 September I reviewed and discussed with her her lipid profile is elevated cannot take statin agreed to try ezetimibe. She follows rheumatologist for polymyalgia rheumatica getting azathioprine. She has attended diabetic education classes and according to her it is helpful and she says pregabalin is helping her. She agreed to try ezetimibe because she cannot take statin    She has lumbar spinal stenosis and sciatica so not able to walk much. Her BMI is 44.97. Review of Systems   Constitutional: Negative. HENT:  Negative for congestion. Eyes:         She had some itching in her eyes and [facility] eyedrops no discharge. No pain. Respiratory:  Negative for cough, shortness of breath and wheezing.     Cardiovascular: Negative. Gastrointestinal:  Negative for blood in stool, constipation, diarrhea, heartburn and nausea. Genitourinary: Negative. Musculoskeletal:  Negative for falls and myalgias. History of chronic back pain with sciatica currently symptoms are under control and also having polymyalgia rheumatica follows rheumatologist.   Skin:  Negative for rash. Neurological:  Negative for dizziness, sensory change and headaches. Psychiatric/Behavioral: Negative. Vitals:    09/23/22 1313 09/23/22 1320 09/23/22 1340   BP: (!) 181/96 (!) 142/75 138/70   Pulse: 72 75 72   Resp: 12     SpO2: 94%     Weight: 238 lb (108 kg)     Height: 5' 1\" (1.549 m)        Physical Exam  Vitals and nursing note reviewed. Constitutional:       General: She is not in acute distress. Appearance: She is not ill-appearing. Comments: Morbid obesity with pleasant personality   HENT:      Ears:      Comments: Mild earwax both ears. Eyes:      Extraocular Movements: Extraocular movements intact. Conjunctiva/sclera: Conjunctivae normal.      Pupils: Pupils are equal, round, and reactive to light. Cardiovascular:      Rate and Rhythm: Normal rate and regular rhythm. Pulses: Normal pulses. Heart sounds: Normal heart sounds. No murmur heard. Pulmonary:      Effort: Pulmonary effort is normal.      Breath sounds: Normal breath sounds. No wheezing, rhonchi or rales. Abdominal:      General: Bowel sounds are normal.      Palpations: Abdomen is soft. Tenderness: There is no abdominal tenderness. Comments: Abdominal obesity. Musculoskeletal:         General: No swelling or deformity. Normal range of motion. Cervical back: Neck supple. Right lower leg: No edema. Left lower leg: No edema. Skin:     General: Skin is warm. Neurological:      General: No focal deficit present. Mental Status: She is alert and oriented to person, place, and time. Mental status is at baseline. Motor: No weakness. Gait: Gait normal.   Psychiatric:         Mood and Affect: Mood normal.         Behavior: Behavior normal.         Thought Content: Thought content normal.       On this date 09/23/2022 I have spent 30 minutes reviewing previous notes, test results and face to face with the patient discussing the diagnosis and importance of compliance with the treatment plan as well as documenting on the day of the visit.     Signed by:  Jarocho Buitrago MD

## 2022-09-23 NOTE — PROGRESS NOTES
Chief Complaint   Patient presents with    Follow Up Chronic Condition    Hypertension     Visit Vitals  BP (!) 142/75 (BP 1 Location: Left upper arm, BP Patient Position: Sitting, BP Cuff Size: Large adult)   Pulse 75   Resp 12   Ht 5' 1\" (1.549 m)   Wt 238 lb (108 kg)   SpO2 94%   BMI 44.97 kg/m²       1. \"Have you been to the ER, urgent care clinic since your last visit? Hospitalized since your last visit? \"  [facility] eye issue     2. \"Have you seen or consulted any other health care providers outside of the [address] [place] [address] since your last visit? \" No     3. For patients aged 39-70: Has the patient had a colonoscopy / FIT/ Cologuard? NA - based on age      If the patient is female:    4. For patients aged 41-77: Has the patient had a mammogram within the past 2 years? NA - based on age or sex      11. For patients aged 21-65: Has the patient had a pap smear?  NA - based on age or sex

## 2022-10-13 RX ORDER — SYRINGE WITH NEEDLE, 1 ML 25GX5/8"
1 SYRINGE, EMPTY DISPOSABLE MISCELLANEOUS
Qty: 100 PEN NEEDLE | Refills: 0 | OUTPATIENT
Start: 2022-10-13

## 2022-10-13 RX ORDER — CYANOCOBALAMIN 1000 UG/ML
1000 INJECTION, SOLUTION INTRAMUSCULAR; SUBCUTANEOUS
Qty: 3 ML | Refills: 0
Start: 2022-10-13

## 2022-10-13 NOTE — TELEPHONE ENCOUNTER
Patient called back to discuss lab results. Has an appt with GI next Friday. Per patient has been trying to get B12 inj sent to the pharmacy with no luck. Would like you to order labs so that she can have them done before next appt.  Will hold off on hematology until she sees GI

## 2022-10-13 NOTE — PROGRESS NOTES
Patient advised of lab results. Would like to continue to work on diet. Has been doing better with diet due to diabetes.

## 2022-10-23 PROBLEM — Z11.59 NEED FOR HEPATITIS C SCREENING TEST: Status: RESOLVED | Noted: 2022-09-23 | Resolved: 2022-10-23

## 2022-12-16 RX ORDER — LISINOPRIL AND HYDROCHLOROTHIAZIDE 12.5; 2 MG/1; MG/1
TABLET ORAL
Qty: 90 TABLET | Refills: 0 | Status: SHIPPED | OUTPATIENT
Start: 2022-12-16

## 2022-12-30 ENCOUNTER — TELEPHONE (OUTPATIENT)
Dept: INTERNAL MEDICINE CLINIC | Age: 77
End: 2022-12-30

## 2022-12-30 NOTE — TELEPHONE ENCOUNTER
----- Message from Antony Swanson sent at 12/29/2022  2:59 PM EST -----  Subject: Appointment Request    Reason for Call: Established Patient Appointment needed: Routine Pre-Op    QUESTIONS    Reason for appointment request? Available appointments did not meet   patient need     Additional Information for Provider?  Pre op Cataract surgery 1/26 please   call to schedule   ---------------------------------------------------------------------------  --------------  Cb YOUNGER  [phone]; OK to leave message on voicemail  ---------------------------------------------------------------------------  --------------  SCRIPT ANSWERS  COVID Screen: Eli Casanova

## 2023-01-16 ENCOUNTER — OFFICE VISIT (OUTPATIENT)
Dept: INTERNAL MEDICINE CLINIC | Age: 78
End: 2023-01-16
Payer: MEDICARE

## 2023-01-16 VITALS
DIASTOLIC BLOOD PRESSURE: 64 MMHG | WEIGHT: 235.8 LBS | TEMPERATURE: 98 F | SYSTOLIC BLOOD PRESSURE: 135 MMHG | HEART RATE: 78 BPM | HEIGHT: 61 IN | OXYGEN SATURATION: 96 % | BODY MASS INDEX: 44.52 KG/M2 | RESPIRATION RATE: 18 BRPM

## 2023-01-16 DIAGNOSIS — M35.3 POLYMYALGIA RHEUMATICA (HCC): ICD-10-CM

## 2023-01-16 DIAGNOSIS — I10 ESSENTIAL HYPERTENSION: ICD-10-CM

## 2023-01-16 DIAGNOSIS — Z01.818 PRE-OPERATIVE CLEARANCE: Primary | ICD-10-CM

## 2023-01-16 DIAGNOSIS — E11.9 CONTROLLED TYPE 2 DIABETES MELLITUS WITHOUT COMPLICATION, WITHOUT LONG-TERM CURRENT USE OF INSULIN (HCC): ICD-10-CM

## 2023-01-16 PROCEDURE — G8536 NO DOC ELDER MAL SCRN: HCPCS | Performed by: STUDENT IN AN ORGANIZED HEALTH CARE EDUCATION/TRAINING PROGRAM

## 2023-01-16 PROCEDURE — G8417 CALC BMI ABV UP PARAM F/U: HCPCS | Performed by: STUDENT IN AN ORGANIZED HEALTH CARE EDUCATION/TRAINING PROGRAM

## 2023-01-16 PROCEDURE — G8510 SCR DEP NEG, NO PLAN REQD: HCPCS | Performed by: STUDENT IN AN ORGANIZED HEALTH CARE EDUCATION/TRAINING PROGRAM

## 2023-01-16 PROCEDURE — 1090F PRES/ABSN URINE INCON ASSESS: CPT | Performed by: STUDENT IN AN ORGANIZED HEALTH CARE EDUCATION/TRAINING PROGRAM

## 2023-01-16 PROCEDURE — 1101F PT FALLS ASSESS-DOCD LE1/YR: CPT | Performed by: STUDENT IN AN ORGANIZED HEALTH CARE EDUCATION/TRAINING PROGRAM

## 2023-01-16 PROCEDURE — G8399 PT W/DXA RESULTS DOCUMENT: HCPCS | Performed by: STUDENT IN AN ORGANIZED HEALTH CARE EDUCATION/TRAINING PROGRAM

## 2023-01-16 PROCEDURE — G8427 DOCREV CUR MEDS BY ELIG CLIN: HCPCS | Performed by: STUDENT IN AN ORGANIZED HEALTH CARE EDUCATION/TRAINING PROGRAM

## 2023-01-16 PROCEDURE — 99204 OFFICE O/P NEW MOD 45 MIN: CPT | Performed by: STUDENT IN AN ORGANIZED HEALTH CARE EDUCATION/TRAINING PROGRAM

## 2023-01-16 NOTE — PROGRESS NOTES
1. \"Have you been to the ER, urgent care clinic since your last visit? Hospitalized since your last visit? \" No    2. \"Have you seen or consulted any other health care providers outside of the [address] [place] [address] since your last visit? \" No     3. For patients aged 39-70: Has the patient had a colonoscopy / FIT/ Cologuard? NA - based on age      If the patient is female:    4. For patients aged 41-77: Has the patient had a mammogram within the past 2 years? NA - based on age or sex      11. For patients aged 21-65: Has the patient had a pap smear?  NA - based on age or sex    Chief Complaint   Patient presents with    Pre-op Exam     Left eye cataract surgery      Visit Vitals  BP (!) 155/57 (BP 1 Location: Right upper arm, BP Patient Position: Sitting, BP Cuff Size: Adult)   Pulse 78   Temp 98 °F (36.7 °C) (Oral)   Resp 18   Ht 5' 1\" (1.549 m)   Wt 235 lb 12.8 oz (107 kg)   SpO2 96%   BMI 44.55 kg/m²

## 2023-01-16 NOTE — PROGRESS NOTES
Sina Harvey (: 1945) is a 68 y.o. female, established patient, here for evaluation of the following chief complaint(s):  Pre-op Exam (Left eye cataract surgery )       ASSESSMENT/PLAN:  Below is the assessment and plan developed based on review of pertinent history, physical exam, labs, studies, and medications. 1. Pre-operative clearance  -     CBC W/O DIFF  -     METABOLIC PANEL, COMPREHENSIVE  2. Controlled type 2 diabetes mellitus without complication, without long-term current use of insulin ([facility]  3. Essential hypertension  4. Polymyalgia rheumatica ([facility])    PCP: Dr. Cory Mahmood     Patient presents for preop clearance for left eye cataract surgery scheduled on  with Dr. Donnis Phoenix. Compliant with medications. Blood work has been ordered. Form will be faxed over once I have the results. Continue medications for hypertension, diabetes. Continue follow-up with rheumatology for polymyalgia rheumatica. RTC for follow-up with PCP. Addendum:  Reviewed recent blood work. Patient is cleared for surgery. Form has been filled and will be faxed over to physicians office along with note. SUBJECTIVE/OBJECTIVE:  STU Harvey is a 66-year-old who presents to the clinic for preop clearance for left cataract surgery on  with Dr. Donnis Phoenix. She has a previous history of hypertension, diabetes, hyperlipidemia, polymyalgia rheumatica and spinal stenosis of lumbar region. She follows with rheumatology- Dr. Broderick Martines office. Currently taking all her medications. No previous hx of heart attacks or henson. No chest pain, sob, dizziness, LOC. She checks blood sugars every other day, fasting around . She is on metformin for her diabetes.       Allergies   Allergen Reactions    Bactrim [Sulfamethoprim] Itching     Current Outpatient Medications   Medication Sig    lisinopril-hydroCHLOROthiazide (PRINZIDE, ZESTORETIC) 20-12.5 mg per tablet TAKE 1 TABLET BY MOUTH IN THE MORNING ezetimibe (ZETIA) 10 mg tablet Take 1 Tablet by mouth daily. metFORMIN (GLUCOPHAGE) 500 mg tablet Take 1 pill once a day with a large meal    pregabalin (LYRICA) 150 mg capsule Take 1 Capsule by mouth two (2) times a day. Max Daily Amount: 300 mg.    fluticasone propionate (FLONASE) 50 mcg/actuation nasal spray Use 2 spray(s) in each nostril once daily    cetirizine (ZYRTEC) 10 mg tablet Take 1 tablet by mouth once daily    aspirin delayed-release 81 mg tablet Take  by mouth daily. azaTHIOprine (IMURAN) 50 mg tablet Take 50 mg by mouth two (2) times a day. folic acid/multivit-min/lutein (CENTRUM SILVER PO) Take  by mouth. diclofenac (VOLTAREN) 1 % gel Apply  to affected area four (4) times daily. cholecalciferol (VITAMIN D3) 25 mcg (1,000 unit) cap Take  by mouth daily. No current facility-administered medications for this visit.      Past Medical History:   Diagnosis Date    Bronchitis     Chronic sciatica 7/23/2020    Degenerative joint disease involving multiple joints 7/23/2020    Diet-controlled type 2 diabetes mellitus ([address] 7/23/2020    Discharge of eye 7/23/2020    Essential hypertension 7/23/2020    Gastroesophageal reflux disease 7/23/2020    Long term current use of systemic steroids 7/23/2020    Mammogram abnormal 7/23/2020    Morbid obesity ([address] 7/23/2020    Motion sickness 7/23/2020    Neck pain 7/23/2020    Osteoarthritis of knee 7/23/2020    Osteoarthritis of multiple joints 7/23/2020    Pain in forearm 7/23/2020    Pain in joint of right shoulder 7/23/2020    Pain in right arm 7/23/2020    Polymyalgia rheumatica ([address] 7/23/2020    Pure hypercholesterolemia 2/2/2022    Screening for cholesterol level 7/23/2020    Screening for malignant neoplasm of breast 7/23/2020    Severe obesity ([address] 7/23/2020    Sleep apnea 7/23/2020    Spinal stenosis of lumbar region 7/23/2020    Vitamin D deficiency 7/23/2020    Wound of skin 7/23/2020     Past Surgical History:   Procedure Laterality Date HX BREAST BIOPSY Right     benign    HX COLONOSCOPY      HX ORTHOPAEDIC Right 09/15/2021    carpal tunnel surgery of R hand    HX ORTHOPAEDIC Left 07/15/2021    carpal tunnel surgery of L hand     Family History   Problem Relation Age of Onset    Heart Disease Mother     Diabetes Mother     Stroke Mother     Heart Disease Father     Stroke Father     Lupus Sister      Social History     Tobacco Use   Smoking Status Never   Smokeless Tobacco Never         Review of Systems   Constitutional:  Negative for fever and malaise/fatigue. HENT:  Negative for congestion, hearing loss and sore throat. Eyes: Negative. Respiratory:  Negative for cough and shortness of breath. Cardiovascular:  Negative for chest pain, palpitations and leg swelling. Gastrointestinal:  Negative for abdominal pain, nausea and vomiting. Genitourinary: Negative. Musculoskeletal:  Positive for back pain and joint pain. Skin: Negative. Neurological:  Negative for focal weakness, loss of consciousness and headaches. Psychiatric/Behavioral: Negative. Vitals:    01/16/23 1418 01/16/23 1426   BP: (!) 155/57 135/64   Pulse: 78    Resp: 18    Temp: 98 °F (36.7 °C)    TempSrc: Oral    SpO2: 96%    Weight: 235 lb 12.8 oz (107 kg)    Height: 5' 1\" (1.549 m)       Physical Exam  Constitutional:       General: She is not in acute distress. Appearance: Normal appearance. HENT:      Head: Normocephalic. Cardiovascular:      Rate and Rhythm: Normal rate and regular rhythm. Heart sounds: Normal heart sounds. Pulmonary:      Effort: Pulmonary effort is normal.      Breath sounds: Normal breath sounds. Abdominal:      General: Bowel sounds are normal. There is no distension. Palpations: Abdomen is soft. Tenderness: There is no abdominal tenderness. Musculoskeletal:         General: No swelling or tenderness. Cervical back: Normal range of motion and neck supple.    Neurological:      General: No focal deficit present. Mental Status: She is alert. Mental status is at baseline. Motor: No weakness. Psychiatric:         Mood and Affect: Mood normal.         Behavior: Behavior normal.             An electronic signature was used to authenticate this note.   -- Reggie Petersen MD

## 2023-01-18 LAB
ALBUMIN SERPL-MCNC: 4.4 G/DL (ref 3.7–4.7)
ALBUMIN/GLOB SERPL: 2 {RATIO} (ref 1.2–2.2)
ALP SERPL-CCNC: 89 IU/L (ref 44–121)
ALT SERPL-CCNC: 15 IU/L (ref 0–32)
AST SERPL-CCNC: 22 IU/L (ref 0–40)
BILIRUB SERPL-MCNC: 0.4 MG/DL (ref 0–1.2)
BUN SERPL-MCNC: 20 MG/DL (ref 8–27)
BUN/CREAT SERPL: 23 (ref 12–28)
CALCIUM SERPL-MCNC: 9.6 MG/DL (ref 8.7–10.3)
CHLORIDE SERPL-SCNC: 102 MMOL/L (ref 96–106)
CO2 SERPL-SCNC: 24 MMOL/L (ref 20–29)
CREAT SERPL-MCNC: 0.88 MG/DL (ref 0.57–1)
EGFR: 68 ML/MIN/1.73
ERYTHROCYTE [DISTWIDTH] IN BLOOD BY AUTOMATED COUNT: 15.2 % (ref 11.7–15.4)
GLOBULIN SER CALC-MCNC: 2.2 G/DL (ref 1.5–4.5)
GLUCOSE SERPL-MCNC: 118 MG/DL (ref 70–99)
HCT VFR BLD AUTO: 35.6 % (ref 34–46.6)
HGB BLD-MCNC: 10.8 G/DL (ref 11.1–15.9)
MCH RBC QN AUTO: 25.7 PG (ref 26.6–33)
MCHC RBC AUTO-ENTMCNC: 30.3 G/DL (ref 31.5–35.7)
MCV RBC AUTO: 85 FL (ref 79–97)
PLATELET # BLD AUTO: 302 X10E3/UL (ref 150–450)
POTASSIUM SERPL-SCNC: 4.4 MMOL/L (ref 3.5–5.2)
PROT SERPL-MCNC: 6.6 G/DL (ref 6–8.5)
RBC # BLD AUTO: 4.2 X10E6/UL (ref 3.77–5.28)
SODIUM SERPL-SCNC: 143 MMOL/L (ref 134–144)
WBC # BLD AUTO: 6.4 X10E3/UL (ref 3.4–10.8)

## 2023-01-18 NOTE — PROGRESS NOTES
Hemoglobin little below normal, can be monitored. Continue taking diabetes medication. Other blood work normal. Your pre op form has been filled and you have been cleared for surgery. It will be faxed over the the physicians office.

## 2023-01-27 ENCOUNTER — TELEPHONE (OUTPATIENT)
Dept: INTERNAL MEDICINE CLINIC | Age: 78
End: 2023-01-27

## 2023-01-27 NOTE — TELEPHONE ENCOUNTER
Patient was seen today for Pre-op with Dr. Bev Clarke today and she wanted her to come back in 4 weeks but there is no where to put her.

## 2023-01-30 ENCOUNTER — OFFICE VISIT (OUTPATIENT)
Dept: PODIATRY | Age: 78
End: 2023-01-30
Payer: MEDICARE

## 2023-01-30 VITALS
BODY MASS INDEX: 44.2 KG/M2 | RESPIRATION RATE: 16 BRPM | TEMPERATURE: 96.6 F | HEIGHT: 61 IN | HEART RATE: 76 BPM | SYSTOLIC BLOOD PRESSURE: 171 MMHG | WEIGHT: 234.13 LBS | DIASTOLIC BLOOD PRESSURE: 76 MMHG

## 2023-01-30 DIAGNOSIS — L98.8 MACERATION OF SKIN: ICD-10-CM

## 2023-01-30 DIAGNOSIS — E11.9 CONTROLLED TYPE 2 DIABETES MELLITUS WITHOUT COMPLICATION, WITHOUT LONG-TERM CURRENT USE OF INSULIN (HCC): Primary | ICD-10-CM

## 2023-01-30 PROCEDURE — 99213 OFFICE O/P EST LOW 20 MIN: CPT | Performed by: PODIATRIST

## 2023-01-30 PROCEDURE — 3077F SYST BP >= 140 MM HG: CPT | Performed by: PODIATRIST

## 2023-01-30 PROCEDURE — G8536 NO DOC ELDER MAL SCRN: HCPCS | Performed by: PODIATRIST

## 2023-01-30 PROCEDURE — G8417 CALC BMI ABV UP PARAM F/U: HCPCS | Performed by: PODIATRIST

## 2023-01-30 PROCEDURE — G8427 DOCREV CUR MEDS BY ELIG CLIN: HCPCS | Performed by: PODIATRIST

## 2023-01-30 PROCEDURE — 3078F DIAST BP <80 MM HG: CPT | Performed by: PODIATRIST

## 2023-01-30 PROCEDURE — G8399 PT W/DXA RESULTS DOCUMENT: HCPCS | Performed by: PODIATRIST

## 2023-01-30 PROCEDURE — G8432 DEP SCR NOT DOC, RNG: HCPCS | Performed by: PODIATRIST

## 2023-01-30 PROCEDURE — 1123F ACP DISCUSS/DSCN MKR DOCD: CPT | Performed by: PODIATRIST

## 2023-01-30 PROCEDURE — 1090F PRES/ABSN URINE INCON ASSESS: CPT | Performed by: PODIATRIST

## 2023-01-30 PROCEDURE — 1101F PT FALLS ASSESS-DOCD LE1/YR: CPT | Performed by: PODIATRIST

## 2023-01-30 NOTE — PROGRESS NOTES
[facility]    Subjective:         Patient is a 68 y.o. female who is being seen as a returning pt for diabetic pedal exam.  Patient states she has close follow-up with her PCP Jd Recinos MD). She states her last office visit with her PCP was 01/16/2023. She describes her diabetes as being under control, noting her last hemoglobin A1c was 6.6%. She denies any overt pedal pain. She denies any recent pedal trauma. She denies any systemic signs of infection but does state she has very sweaty feet.   She denies any other lower extremity complaints        Past Medical History:   Diagnosis Date    Bronchitis     Chronic sciatica 7/23/2020    Degenerative joint disease involving multiple joints 7/23/2020    Diet-controlled type 2 diabetes mellitus ([address] 7/23/2020    Discharge of eye 7/23/2020    Essential hypertension 7/23/2020    Gastroesophageal reflux disease 7/23/2020    Long term current use of systemic steroids 7/23/2020    Mammogram abnormal 7/23/2020    Morbid obesity ([address] 7/23/2020    Motion sickness 7/23/2020    Neck pain 7/23/2020    Osteoarthritis of knee 7/23/2020    Osteoarthritis of multiple joints 7/23/2020    Pain in forearm 7/23/2020    Pain in joint of right shoulder 7/23/2020    Pain in right arm 7/23/2020    Polymyalgia rheumatica ([address] 7/23/2020    Pure hypercholesterolemia 2/2/2022    Screening for cholesterol level 7/23/2020    Screening for malignant neoplasm of breast 7/23/2020    Severe obesity ([address] 7/23/2020    Sleep apnea 7/23/2020    Spinal stenosis of lumbar region 7/23/2020    Vitamin D deficiency 7/23/2020    Wound of skin 7/23/2020     Past Surgical History:   Procedure Laterality Date    HX BREAST BIOPSY Right     benign    HX COLONOSCOPY      HX ORTHOPAEDIC Right 09/15/2021    carpal tunnel surgery of R hand    HX ORTHOPAEDIC Left 07/15/2021    carpal tunnel surgery of L hand       Family History   Problem Relation Age of Onset    Heart Disease Mother Diabetes Mother     Stroke Mother     Heart Disease Father     Stroke Father     Lupus Sister       Social History     Tobacco Use    Smoking status: Never    Smokeless tobacco: Never   Substance Use Topics    Alcohol use: Not Currently     Allergies   Allergen Reactions    Bactrim [Sulfamethoprim] Itching     Prior to Admission medications    Medication Sig Start Date End Date Taking? Authorizing Provider   lisinopril-hydroCHLOROthiazide (PRINZIDE, ZESTORETIC) 20-12.5 mg per tablet TAKE 1 TABLET BY MOUTH IN THE MORNING 12/16/22   Dewayne Vega MD   ezetimibe (ZETIA) 10 mg tablet Take 1 Tablet by mouth daily. 9/23/22   Dewayne Vega MD   metFORMIN (GLUCOPHAGE) 500 mg tablet Take 1 pill once a day with a large meal 8/23/22   Dewayne Vega MD   pregabalin (LYRICA) 150 mg capsule Take 1 Capsule by mouth two (2) times a day. Max Daily Amount: 300 mg. 8/22/22   Dewayne Vega MD   fluticasone propionate (FLONASE) 50 mcg/actuation nasal spray Use 2 spray(s) in each nostril once daily 4/19/22   Dewayne Vega MD   cetirizine (ZYRTEC) 10 mg tablet Take 1 tablet by mouth once daily 4/20/21   Dewayne Vega MD   aspirin delayed-release 81 mg tablet Take  by mouth daily. Provider, Historical   azaTHIOprine (IMURAN) 50 mg tablet Take 50 mg by mouth two (2) times a day. Provider, Historical   folic acid/multivit-min/lutein (CENTRUM SILVER PO) Take  by mouth. Provider, Historical   diclofenac (VOLTAREN) 1 % gel Apply  to affected area four (4) times daily. Provider, Historical   cholecalciferol (VITAMIN D3) 25 mcg (1,000 unit) cap Take  by mouth daily. Provider, Historical       Review of Systems   Constitutional: Negative. HENT: Negative. Eyes: Negative. Respiratory:  Positive for apnea. Cardiovascular: Negative. Gastrointestinal: Negative. Endocrine: Negative. Genitourinary: Negative. Musculoskeletal:  Positive for arthralgias and back pain. Skin: Negative. Allergic/Immunologic: Positive for environmental allergies and immunocompromised state. Neurological: Negative. Hematological: Negative. Psychiatric/Behavioral: Negative. All other systems reviewed and are negative. Objective:     Visit Vitals  BP (!) 171/76 (BP 1 Location: Left upper arm, BP Patient Position: Sitting, BP Cuff Size: Adult)   Pulse 76   Temp (!) 96.6 °F (35.9 °C)   Resp 16   Ht 5' 1\" (1.549 m)   Wt 234 lb 2 oz (106.2 kg)   BMI 44.24 kg/m²       Physical Exam  Vitals reviewed. Constitutional:       Appearance: She is morbidly obese. Cardiovascular:      Pulses:           Dorsalis pedis pulses are 2+ on the right side and 2+ on the left side. Posterior tibial pulses are 2+ on the right side and 2+ on the left side. Pulmonary:      Effort: Pulmonary effort is normal.   Musculoskeletal:      Right lower leg: No edema. Left lower leg: No edema. Right foot: Normal range of motion. No deformity or bunion. Left foot: Normal range of motion. No deformity or bunion. Feet:      Right foot:      Protective Sensation: 10 sites tested. 10 sites sensed. Skin integrity: Dry skin present. Toenail Condition: Right toenails are abnormally thick. Left foot:      Protective Sensation: 10 sites tested. Skin integrity: Dry skin present. Toenail Condition: Left toenails are abnormally thick. Lymphadenopathy:      Lower Body: No right inguinal adenopathy. No left inguinal adenopathy. Skin:     General: Skin is warm. Capillary Refill: Capillary refill takes 2 to 3 seconds. Comments: Absent pedal hair growth with hyperpigmentation. Maceration to webspaces   Neurological:      Mental Status: She is alert and oriented to person, place, and time. Comments: Paresthesias/cold feeling in feet noted   Psychiatric:         Mood and Affect: Mood and affect normal.         Behavior: Behavior is cooperative.        Data Review: No results found for this or any previous visit (from the past 24 hour(s)). Impression:       ICD-10-CM ICD-9-CM    1. Controlled type 2 diabetes mellitus without complication, without long-term current use of insulin (HCC)  E11.9 250.00       2. Maceration of skin  L98.8 709.8           Recommendation:     Patient seen and evaluated in the office  Discussed and educated patient regarding his/her current medical condition as it pertains to his/her diabetes and his/her overall pedal health. Instructed patient to monitor his/her feet daily for possible wound formation, be compliant with all medications and wear supportive shoe gear for wound prevention. Reviewed and discussed most recent lab work, specifically as it pertains to his/her diabetes. Encourage patient to utilize antiperspirant between her toes to decrease sweating and to change her socks multiple times a day  Pt verbalized understanding of all discussed and reviewed           Paco Dickson DPM, CW, [address] [place] [address] and Foot Surgery  [address]  O: [phone]  F: [phone]    * [employer] available 24/7

## 2023-01-30 NOTE — PROGRESS NOTES
Visit Vitals  BP (!) 171/76 (BP 1 Location: Left upper arm, BP Patient Position: Sitting, BP Cuff Size: Adult)   Pulse 76   Temp (!) 96.6 °F (35.9 °C)   Resp 16   Ht 5' 1\" (1.549 m)   Wt 234 lb 2 oz (106.2 kg)   BMI 44.24 kg/m²     Chief Complaint   Patient presents with    Follow-up     Foot care; toenail trim       1. Have you been to the ER, urgent care clinic since your last visit? Hospitalized since your last visit? No    2. Have you seen or consulted any other health care providers outside of the [address] [place] [address] since your last visit? Include any pap smears or colon screening.  No

## 2023-02-13 DIAGNOSIS — M48.061 SPINAL STENOSIS OF LUMBAR REGION, UNSPECIFIED WHETHER NEUROGENIC CLAUDICATION PRESENT: ICD-10-CM

## 2023-02-13 RX ORDER — PREGABALIN 150 MG/1
CAPSULE ORAL
Qty: 180 CAPSULE | Refills: 0 | Status: SHIPPED | OUTPATIENT
Start: 2023-02-13

## 2023-02-28 RX ORDER — METFORMIN HYDROCHLORIDE 500 MG/1
TABLET ORAL
Qty: 90 TABLET | Refills: 0 | Status: SHIPPED | OUTPATIENT
Start: 2023-02-28

## 2023-03-13 ENCOUNTER — TELEPHONE (OUTPATIENT)
Dept: INTERNAL MEDICINE CLINIC | Age: 78
End: 2023-03-13

## 2023-03-13 NOTE — TELEPHONE ENCOUNTER
----- Message from [facility] sent at 3/10/2023 11:45 AM EST -----  Subject: Appointment Request    Reason for Call: Established Patient Appointment needed: Routine Pre-Op    QUESTIONS    Reason for appointment request? No appointments available during search     Additional Information for Provider? Pt is having eye surgery 04/20/23 and   needs a pre op appt. Pt states she doesn't need EKG. Please call pt with   appt.  Pt would like any do but Tuesday if possible   ---------------------------------------------------------------------------  --------------  [facility]  [phone]; OK to leave message on voicemail  ---------------------------------------------------------------------------  --------------  SCRIPT ANSWERS  COVID Screen: Oneida Murphy

## 2023-04-03 PROBLEM — E11.8 CONTROLLED TYPE 2 DIABETES MELLITUS WITH COMPLICATION, WITHOUT LONG-TERM CURRENT USE OF INSULIN (HCC): Status: RESOLVED | Noted: 2021-02-16 | Resolved: 2021-07-25

## 2023-04-18 ENCOUNTER — TELEPHONE (OUTPATIENT)
Dept: INTERNAL MEDICINE CLINIC | Age: 78
End: 2023-04-18

## 2023-04-18 NOTE — TELEPHONE ENCOUNTER
Patient called checking to see if her lab results came back for her Pre Op Physical for Cataract Surgery scheduled for Thursday, April 20th      Was told by Dr. Welch Room office if lab results and whatever else is needed is not received by tomorrow they will not be able to do her surgery. Patient wants to know if you can please review and complete paperwork and fax back to them so she can have her surgery as scheduled.

## 2023-04-21 ENCOUNTER — OFFICE VISIT (OUTPATIENT)
Dept: INTERNAL MEDICINE CLINIC | Age: 78
End: 2023-04-21

## 2023-04-21 VITALS
TEMPERATURE: 98.2 F | BODY MASS INDEX: 44.93 KG/M2 | SYSTOLIC BLOOD PRESSURE: 124 MMHG | DIASTOLIC BLOOD PRESSURE: 64 MMHG | HEART RATE: 72 BPM | HEIGHT: 61 IN | WEIGHT: 238 LBS

## 2023-04-21 DIAGNOSIS — E11.9 CONTROLLED TYPE 2 DIABETES MELLITUS WITHOUT COMPLICATION, WITHOUT LONG-TERM CURRENT USE OF INSULIN (HCC): ICD-10-CM

## 2023-04-21 DIAGNOSIS — E78.00 PURE HYPERCHOLESTEROLEMIA: ICD-10-CM

## 2023-04-21 DIAGNOSIS — M35.3 POLYMYALGIA RHEUMATICA (HCC): ICD-10-CM

## 2023-04-21 DIAGNOSIS — E66.01 OBESITY, CLASS III, BMI 40-49.9 (MORBID OBESITY) (HCC): ICD-10-CM

## 2023-04-21 DIAGNOSIS — I10 ESSENTIAL HYPERTENSION: Primary | ICD-10-CM

## 2023-04-21 DIAGNOSIS — M48.061 SPINAL STENOSIS OF LUMBAR REGION, UNSPECIFIED WHETHER NEUROGENIC CLAUDICATION PRESENT: ICD-10-CM

## 2023-04-21 PROBLEM — Z79.52 LONG TERM CURRENT USE OF SYSTEMIC STEROIDS: Status: RESOLVED | Noted: 2020-07-23 | Resolved: 2023-04-21

## 2023-04-21 PROBLEM — K21.9 GASTROESOPHAGEAL REFLUX DISEASE: Status: RESOLVED | Noted: 2020-07-23 | Resolved: 2023-04-21

## 2023-04-21 NOTE — PROGRESS NOTES
1. \"Have you been to the ER, urgent care clinic since your last visit? Hospitalized since your last visit? \" No    2. \"Have you seen or consulted any other health care providers outside of the [address] [place] [address] since your last visit? \" Yes Where: [facility]      3. For patients aged 39-70: Has the patient had a colonoscopy / FIT/ Cologuard? NA - based on age      If the patient is female:    4. For patients aged 41-77: Has the patient had a mammogram within the past 2 years? NA - based on age or sex      11. For patients aged 21-65: Has the patient had a pap smear?  NA - based on age or sex

## 2023-04-21 NOTE — PROGRESS NOTES
Sulma Howell (: 1945) is a 66 y.o. female, established patient, here for evaluation of the following chief complaint(s):  Follow Up Chronic Condition         ASSESSMENT/PLAN:  Below is the assessment and plan developed based on review of pertinent history, physical exam, labs, studies, and medications. 1. Essential hypertension  2. Controlled type 2 diabetes mellitus without complication, without long-term current use of insulin ([facility] [address]  3. Pure hypercholesterolemia  4. Polymyalgia rheumatica (HCC)  5. Spinal stenosis of lumbar region, unspecified whether neurogenic claudication present  6. Obesity, Class III, BMI 40-49.9 (morbid obesity) (HCC)    Hypertension, well controlled, continue current medications. Lisinopril HCTZ 20/12.5 mg once a day morning. Type 2 diabetes mellitus, non-insulin-dependent, well controlled, continue metformin 500 mg once a day with large meal.  Continue low-dose aspirin. Cannot take statin causing myalgia. Continue walking minimum 2 miles per day. She has attended diabetic education classes and she has learned to log and diet low in sugar and starch. Regular follow-up with ophthalmologist.  She is going to have cataract surgery with Dr. Rudolph Tomas but recently she had some redness of her eye after putting eyedrops so it is little bit postponed it. She does not want to do labs today. Hypercholesteremia cannot take statin causing statin induced myalgia. Allergies taking fluticasone nasal spray and cetirizine. Polymyalgia rheumatica. She follows rheumatologist.  She has been off from prednisone. Taking azathioprine 50 mg twice a day. Lumbar spinal stenosis with history of DJD and sciatica. Taking Lyrica. She says she is okay but not worsening at the same time she is not improving in her back pain      No depression. She is having very pleasant personality. Healthy lifestyle.     Return in about 3 months (around 2023) for diabetes, hypertension,cholesterol follow up. SUBJECTIVE/OBJECTIVE:  HPI    Becki Samuel with a very pleasant personality came for regular follow-up. Her cataract surgery in her right eye is postponed it because she developed some acute redness in the eyes only in right eye with itching and that was started after putting eyedrops in it was not in left eye and probably etiology was due to allergic reaction to eyedrops. Now she is feeling better. Her blood pressure is well controlled with current medication after resting when I checked manually in both upper arms. She is compliant for medicines. She is getting Lyrica/pregabalin for her history of sciatica and she has polymyalgia rheumatica now taking Imuran under the care of rheumatologist Dr. Titus Cheng and she is off from prednisone. She has no depression. She cannot take statin she has attended diabetic education classes. Her last hemoglobin A1c was 6.6% in June 2022. Today we could not do a hemoglobin A1c in the office because we ran out. She does not want to do by IV because she just had blood work ordered by Ms. Fernando Duff physician assistant I reviewed the labs and discussed with her explained that she can come in the office in next 2 to 3 weeks to check hemoglobin A1c in our office. Her hemoglobin also improved to 11.3% compared to 10.8 in January 2023. She takes vitamin D3 and multivitamin and folic acid. Allergies   Allergen Reactions    Bactrim [Sulfamethoprim] Itching     Current Outpatient Medications   Medication Sig    lisinopril-hydroCHLOROthiazide (PRINZIDE, ZESTORETIC) 20-12.5 mg per tablet Take 1 Tablet by mouth Every morning.     metFORMIN (GLUCOPHAGE) 500 mg tablet TAKE 1 TABLET BY MOUTH ONCE DAILY WITH LARGE MEAL    pregabalin (LYRICA) 150 mg capsule TAKE 1 CAPSULE BY MOUTH TWICE DAILY MAX  DAILY  AMOUNT  IS  300MG    fluticasone propionate (FLONASE) 50 mcg/actuation nasal spray Use 2 spray(s) in each nostril once daily    cetirizine (ZYRTEC) 10 mg tablet Take 1 tablet by mouth once daily    aspirin delayed-release 81 mg tablet Take  by mouth daily. azaTHIOprine (IMURAN) 50 mg tablet Take 1 Tablet by mouth two (2) times a day. folic acid/multivit-min/lutein (CENTRUM SILVER PO) Take  by mouth. diclofenac (VOLTAREN) 1 % gel Apply  to affected area four (4) times daily. cholecalciferol (VITAMIN D3) 25 mcg (1,000 unit) cap Take  by mouth daily. No current facility-administered medications for this visit.      Past Medical History:   Diagnosis Date    Bronchitis     Chronic sciatica 7/23/2020    Degenerative joint disease involving multiple joints 7/23/2020    Diet-controlled type 2 diabetes mellitus ([address] 7/23/2020    Discharge of eye 7/23/2020    Essential hypertension 7/23/2020    Gastroesophageal reflux disease 7/23/2020    Long term current use of systemic steroids 7/23/2020    Mammogram abnormal 7/23/2020    Morbid obesity ([address] 7/23/2020    Motion sickness 7/23/2020    Neck pain 7/23/2020    Osteoarthritis of knee 7/23/2020    Osteoarthritis of multiple joints 7/23/2020    Pain in forearm 7/23/2020    Pain in joint of right shoulder 7/23/2020    Pain in right arm 7/23/2020    Polymyalgia rheumatica ([address] 7/23/2020    Pure hypercholesterolemia 2/2/2022    Screening for cholesterol level 7/23/2020    Screening for malignant neoplasm of breast 7/23/2020    Severe obesity ([address] 7/23/2020    Sleep apnea 7/23/2020    Spinal stenosis of lumbar region 7/23/2020    Vitamin D deficiency 7/23/2020    Wound of skin 7/23/2020     Past Surgical History:   Procedure Laterality Date    HX BREAST BIOPSY Right     benign    HX COLONOSCOPY      HX ORTHOPAEDIC Right 09/15/2021    carpal tunnel surgery of R hand    HX ORTHOPAEDIC Left 07/15/2021    carpal tunnel surgery of L hand     Family History   Problem Relation Age of Onset    Heart Disease Mother     Diabetes Mother     Stroke Mother     Heart Disease Father     Stroke Father     Lupus Sister Review of Systems   Constitutional: Negative. HENT:  Negative for sinus pain and sore throat. Eyes:  Negative for blurred vision. Recently had redness and itching in the right eye and her cataract surgery in the right eye is postponed it and probably it was an allergic reaction to using eyedrops and now she is feeling better under the care of ophthalmologist Dr. Agustín Jacobsen. Respiratory:  Negative for cough, shortness of breath and wheezing. Gastrointestinal: Negative. Genitourinary: Negative. Musculoskeletal: Negative. Neurological:  Negative for dizziness, tingling, tremors, sensory change and headaches. Endo/Heme/Allergies:  Negative for polydipsia. Does not bruise/bleed easily. Psychiatric/Behavioral: Negative. Vitals:    04/21/23 1351 04/21/23 1431 04/21/23 1434   BP: (!) 150/57 120/70 124/64   Pulse: 72 72    Temp: 98.2 °F (36.8 °C)     TempSrc: Temporal     Weight: 238 lb (108 kg)     Height: 5' 1\" (1.549 m)        Physical Exam  Vitals and nursing note reviewed. Constitutional:       General: She is not in acute distress. Appearance: Normal appearance. She is not ill-appearing, toxic-appearing or diaphoretic. Eyes:      Pupils: Pupils are equal, round, and reactive to light. Comments: Minimal redness in the right eye but no pain while having right eye movement. .   Cardiovascular:      Rate and Rhythm: Normal rate and regular rhythm. Pulses: Normal pulses. Heart sounds: Normal heart sounds. Pulmonary:      Effort: Pulmonary effort is normal.      Breath sounds: Normal breath sounds. No rhonchi or rales. Abdominal:      General: Abdomen is flat. Bowel sounds are normal. There is no distension. Palpations: Abdomen is soft. There is no mass. Tenderness: There is no abdominal tenderness. There is no guarding. Musculoskeletal:         General: No swelling or tenderness. Cervical back: Neck supple. Right lower leg: No edema. Left lower leg: No edema. Skin:     Findings: No bruising. Neurological:      General: No focal deficit present. Mental Status: She is alert and oriented to person, place, and time. Mental status is at baseline. Cranial Nerves: No cranial nerve deficit. Motor: No weakness. Gait: Gait normal.   Psychiatric:         Mood and Affect: Mood normal.         Behavior: Behavior normal.       An electronic signature was used to authenticate this note.   -- Zulma Diaz MD

## 2023-05-08 ENCOUNTER — OFFICE VISIT (OUTPATIENT)
Age: 78
End: 2023-05-08
Payer: MEDICARE

## 2023-05-08 VITALS
BODY MASS INDEX: 44.93 KG/M2 | DIASTOLIC BLOOD PRESSURE: 58 MMHG | HEIGHT: 61 IN | RESPIRATION RATE: 16 BRPM | SYSTOLIC BLOOD PRESSURE: 140 MMHG | HEART RATE: 69 BPM | WEIGHT: 238 LBS

## 2023-05-08 DIAGNOSIS — E11.9 TYPE 2 DIABETES MELLITUS WITHOUT COMPLICATION, UNSPECIFIED WHETHER LONG TERM INSULIN USE (HCC): Primary | ICD-10-CM

## 2023-05-08 DIAGNOSIS — L60.3 ONYCHODYSTROPHY: ICD-10-CM

## 2023-05-08 DIAGNOSIS — L98.8 MACERATION OF SKIN: ICD-10-CM

## 2023-05-08 PROCEDURE — G8428 CUR MEDS NOT DOCUMENT: HCPCS | Performed by: PODIATRIST

## 2023-05-08 PROCEDURE — G8417 CALC BMI ABV UP PARAM F/U: HCPCS | Performed by: PODIATRIST

## 2023-05-08 PROCEDURE — 4004F PT TOBACCO SCREEN RCVD TLK: CPT | Performed by: PODIATRIST

## 2023-05-08 PROCEDURE — 3078F DIAST BP <80 MM HG: CPT | Performed by: PODIATRIST

## 2023-05-08 PROCEDURE — 99213 OFFICE O/P EST LOW 20 MIN: CPT | Performed by: PODIATRIST

## 2023-05-08 PROCEDURE — 1123F ACP DISCUSS/DSCN MKR DOCD: CPT | Performed by: PODIATRIST

## 2023-05-08 PROCEDURE — 3074F SYST BP LT 130 MM HG: CPT | Performed by: PODIATRIST

## 2023-05-08 PROCEDURE — 1090F PRES/ABSN URINE INCON ASSESS: CPT | Performed by: PODIATRIST

## 2023-05-08 PROCEDURE — G8399 PT W/DXA RESULTS DOCUMENT: HCPCS | Performed by: PODIATRIST

## 2023-05-15 DIAGNOSIS — M48.061 SPINAL STENOSIS, LUMBAR REGION WITHOUT NEUROGENIC CLAUDICATION: Primary | ICD-10-CM

## 2023-05-15 RX ORDER — PREGABALIN 150 MG/1
CAPSULE ORAL
Qty: 180 CAPSULE | Refills: 1 | Status: SHIPPED | OUTPATIENT
Start: 2023-05-15 | End: 2023-08-13

## 2023-06-06 NOTE — PROGRESS NOTES
[facility]          Subjective:              Patient is a 68 y.o. female who is being seen as a returning pt for diabetic pedal exam and evaluation of her interdigital maceration. Patient states she has close follow-up with her PCP Jovana Dent MD). She states her last office visit with her PCP was 04/21/2023. She describes her diabetes  as being under control. She denies any overt pedal pain. She denies any recent pedal trauma. She denies any systemic signs of infection but does state she has very sweaty feet. She has been intermittently compliant with changing her socks and utilizing the foot powder.   She denies any other lower extremity  complaints           Past Medical History:   Diagnosis Date    Diabetes ([facility] [address]     GERD (gastroesophageal reflux disease)     HTN (hypertension)     Hypercholesteremia     Morbid obesity (HCC)     Osteoarthritis     Sciatica     Sleep apnea     Vitamin D deficiency         Current Outpatient Medications:     metFORMIN (GLUCOPHAGE) 500 MG tablet, TAKE 1 TABLET BY MOUTH ONCE DAILY WITH LARGE MEAL, Disp: 90 tablet, Rfl: 0    pregabalin (LYRICA) 150 MG capsule, TAKE 1 CAPSULE BY MOUTH TWICE DAILY MAX  DAILY  AMOUNT  IS  300  MG, Disp: 180 capsule, Rfl: 1    aspirin 81 MG EC tablet, Take by mouth daily, Disp: , Rfl:     azaTHIOprine (IMURAN) 50 MG tablet, Take 50 mg by mouth 2 times daily, Disp: , Rfl:     cetirizine (ZYRTEC) 10 MG tablet, Take 1 tablet by mouth daily, Disp: , Rfl:     vitamin D 25 MCG (1000 UT) CAPS, Take by mouth daily, Disp: , Rfl:     diclofenac sodium (VOLTAREN) 1 % GEL, Apply topically 4 times daily, Disp: , Rfl:     ezetimibe (ZETIA) 10 MG tablet, Take 10 mg by mouth daily, Disp: , Rfl:     fluticasone (FLONASE) 50 MCG/ACT nasal spray, Use 2 spray(s) in each nostril once daily, Disp: , Rfl:     lisinopril-hydroCHLOROthiazide (PRINZIDE;ZESTORETIC) 20-12.5 MG per tablet, Take 1 tablet by mouth every morning, Disp: , Rfl:

## 2023-08-08 ENCOUNTER — OFFICE VISIT (OUTPATIENT)
Age: 78
End: 2023-08-08
Payer: MEDICARE

## 2023-08-08 DIAGNOSIS — L60.3 ONYCHODYSTROPHY: ICD-10-CM

## 2023-08-08 DIAGNOSIS — L98.8 MACERATION OF SKIN: ICD-10-CM

## 2023-08-08 DIAGNOSIS — E11.9 CONTROLLED TYPE 2 DIABETES MELLITUS WITHOUT COMPLICATION, WITHOUT LONG-TERM CURRENT USE OF INSULIN (HCC): Primary | ICD-10-CM

## 2023-08-08 PROCEDURE — 1123F ACP DISCUSS/DSCN MKR DOCD: CPT | Performed by: PODIATRIST

## 2023-08-08 PROCEDURE — 1036F TOBACCO NON-USER: CPT | Performed by: PODIATRIST

## 2023-08-08 PROCEDURE — G8427 DOCREV CUR MEDS BY ELIG CLIN: HCPCS | Performed by: PODIATRIST

## 2023-08-08 PROCEDURE — G8399 PT W/DXA RESULTS DOCUMENT: HCPCS | Performed by: PODIATRIST

## 2023-08-08 PROCEDURE — G8417 CALC BMI ABV UP PARAM F/U: HCPCS | Performed by: PODIATRIST

## 2023-08-08 PROCEDURE — 99213 OFFICE O/P EST LOW 20 MIN: CPT | Performed by: PODIATRIST

## 2023-08-08 PROCEDURE — 11721 DEBRIDE NAIL 6 OR MORE: CPT | Performed by: PODIATRIST

## 2023-08-08 PROCEDURE — 1090F PRES/ABSN URINE INCON ASSESS: CPT | Performed by: PODIATRIST

## 2023-08-10 PROBLEM — E66.01 SEVERE OBESITY (BMI 35.0-39.9) WITH COMORBIDITY (HCC): Status: RESOLVED | Noted: 2020-07-23 | Resolved: 2023-08-10

## 2023-08-18 ENCOUNTER — OFFICE VISIT (OUTPATIENT)
Facility: CLINIC | Age: 78
End: 2023-08-18

## 2023-08-18 VITALS
RESPIRATION RATE: 18 BRPM | OXYGEN SATURATION: 98 % | SYSTOLIC BLOOD PRESSURE: 130 MMHG | TEMPERATURE: 97.2 F | HEIGHT: 61 IN | HEART RATE: 80 BPM | DIASTOLIC BLOOD PRESSURE: 60 MMHG | BODY MASS INDEX: 45.07 KG/M2 | WEIGHT: 238.7 LBS

## 2023-08-18 DIAGNOSIS — J30.9 ALLERGIC RHINITIS, UNSPECIFIED SEASONALITY, UNSPECIFIED TRIGGER: ICD-10-CM

## 2023-08-18 DIAGNOSIS — M35.3 POLYMYALGIA RHEUMATICA (HCC): ICD-10-CM

## 2023-08-18 DIAGNOSIS — M48.061 SPINAL STENOSIS OF LUMBAR REGION WITHOUT NEUROGENIC CLAUDICATION: ICD-10-CM

## 2023-08-18 DIAGNOSIS — I10 ESSENTIAL HYPERTENSION: Primary | ICD-10-CM

## 2023-08-18 DIAGNOSIS — Z78.9 STATIN INTOLERANCE: ICD-10-CM

## 2023-08-18 DIAGNOSIS — M15.9 PRIMARY OSTEOARTHRITIS INVOLVING MULTIPLE JOINTS: ICD-10-CM

## 2023-08-18 DIAGNOSIS — G47.30 SLEEP APNEA, UNSPECIFIED TYPE: ICD-10-CM

## 2023-08-18 DIAGNOSIS — E78.00 PURE HYPERCHOLESTEROLEMIA: ICD-10-CM

## 2023-08-18 DIAGNOSIS — E11.9 CONTROLLED TYPE 2 DIABETES MELLITUS WITHOUT COMPLICATION, WITHOUT LONG-TERM CURRENT USE OF INSULIN (HCC): ICD-10-CM

## 2023-08-18 LAB
GLUCOSE, POC: 146 MG/DL
HBA1C MFR BLD: 6.6 %

## 2023-08-18 RX ORDER — LISINOPRIL AND HYDROCHLOROTHIAZIDE 20; 12.5 MG/1; MG/1
1 TABLET ORAL EVERY MORNING
Qty: 90 TABLET | Refills: 1 | Status: SHIPPED | OUTPATIENT
Start: 2023-08-18

## 2023-08-18 RX ORDER — SEMAGLUTIDE 1.34 MG/ML
INJECTION, SOLUTION SUBCUTANEOUS
Qty: 9 ML | Refills: 0 | Status: SHIPPED | OUTPATIENT
Start: 2023-08-18 | End: 2023-11-16

## 2023-08-18 SDOH — ECONOMIC STABILITY: INCOME INSECURITY: HOW HARD IS IT FOR YOU TO PAY FOR THE VERY BASICS LIKE FOOD, HOUSING, MEDICAL CARE, AND HEATING?: NOT HARD AT ALL

## 2023-08-18 SDOH — ECONOMIC STABILITY: HOUSING INSECURITY
IN THE LAST 12 MONTHS, WAS THERE A TIME WHEN YOU DID NOT HAVE A STEADY PLACE TO SLEEP OR SLEPT IN A SHELTER (INCLUDING NOW)?: NO

## 2023-08-18 SDOH — ECONOMIC STABILITY: FOOD INSECURITY: WITHIN THE PAST 12 MONTHS, THE FOOD YOU BOUGHT JUST DIDN'T LAST AND YOU DIDN'T HAVE MONEY TO GET MORE.: NEVER TRUE

## 2023-08-18 SDOH — ECONOMIC STABILITY: FOOD INSECURITY: WITHIN THE PAST 12 MONTHS, YOU WORRIED THAT YOUR FOOD WOULD RUN OUT BEFORE YOU GOT MONEY TO BUY MORE.: NEVER TRUE

## 2023-08-18 ASSESSMENT — ENCOUNTER SYMPTOMS
RESPIRATORY NEGATIVE: 1
ALLERGIC/IMMUNOLOGIC NEGATIVE: 1
EYES NEGATIVE: 1
GASTROINTESTINAL NEGATIVE: 1

## 2023-08-18 ASSESSMENT — PATIENT HEALTH QUESTIONNAIRE - PHQ9
2. FEELING DOWN, DEPRESSED OR HOPELESS: 0
SUM OF ALL RESPONSES TO PHQ QUESTIONS 1-9: 0
SUM OF ALL RESPONSES TO PHQ9 QUESTIONS 1 & 2: 0
SUM OF ALL RESPONSES TO PHQ QUESTIONS 1-9: 0
1. LITTLE INTEREST OR PLEASURE IN DOING THINGS: 0

## 2023-08-18 NOTE — PROGRESS NOTES
Elijah Allen (: 1945) is a 66 y.o. female, Established patient patient, here for evaluation of the following chief complaint(s):  Follow-up Chronic Condition and Medication Refill         ASSESSMENT/PLAN:  Below is the assessment and plan developed based on review of pertinent history, physical exam, labs, studies, and medications. 1. Essential hypertension  2. Controlled type 2 diabetes mellitus without complication, without long-term current use of insulin ([facility])  -     AMB POC GLUCOSE BLOOD, BY GLUCOSE MONITORING DEVICE  -     AMB POC HEMOGLOBIN A1C  3. Pure hypercholesterolemia  4. Spinal stenosis of lumbar region without neurogenic claudication  5. BMI 40.0-44.9, adult ([address])  6. Polymyalgia rheumatica ([address])  7. Primary osteoarthritis involving multiple joints  8. Allergic rhinitis, unspecified seasonality, unspecified trigger  9. Statin intolerance  10. Sleep apnea, unspecified type      Hypertension, controlled, continue lisinopril HCTZ 20/12.5 mg once a day morning. Diet low in sodium. Type 2 diabetes mellitus, non-insulin-dependent, controlled, she takes metformin 500 mg once a day. Her hemoglobin A1c is 6.6% and it was same in 2023 and her post breakfast blood sugar is 146 in the office. She could not afford GLP-1 agonist so started on Ozempic so that she can have better blood sugar and helping in her musculoskeletal problems by affecting indirectly on the weight. She will start with 0.25 mg once a week for 4 weeks and then 0.5 mg once a week for another 1 month. And she will call me    She follows ophthalmologist.  Diet low in sugar and starch. She has attended diabetic education classes. If she does not get Ozempic she should take metformin 500 mg twice a day with meal continue low-dose aspirin. She cannot take statin. Taking ezetimibe    Polymyalgia rheumatica and lumbar spinal stenosis getting azathioprine and pregabalin.     She states she is functional in her ADL and

## 2023-08-21 ENCOUNTER — TRANSCRIBE ORDERS (OUTPATIENT)
Facility: HOSPITAL | Age: 78
End: 2023-08-21

## 2023-08-21 DIAGNOSIS — Z12.31 SCREENING MAMMOGRAM FOR HIGH-RISK PATIENT: Primary | ICD-10-CM

## 2023-09-05 ENCOUNTER — HOSPITAL ENCOUNTER (OUTPATIENT)
Facility: HOSPITAL | Age: 78
Discharge: HOME | End: 2023-09-08
Attending: INTERNAL MEDICINE
Payer: MEDICARE

## 2023-09-05 ENCOUNTER — TELEPHONE (OUTPATIENT)
Facility: CLINIC | Age: 78
End: 2023-09-05

## 2023-09-05 VITALS — WEIGHT: 238 LBS | HEIGHT: 61 IN | BODY MASS INDEX: 44.93 KG/M2

## 2023-09-05 DIAGNOSIS — Z12.31 SCREENING MAMMOGRAM FOR HIGH-RISK PATIENT: ICD-10-CM

## 2023-09-05 PROCEDURE — 77063 BREAST TOMOSYNTHESIS BI: CPT

## 2023-09-05 NOTE — TELEPHONE ENCOUNTER
Patient came in today and stated that the pharmacy stated that the prescription for Semaglutide dosage didn't add up and she would like it resent to [facility].

## 2023-09-07 NOTE — PROGRESS NOTES
[facility]          Subjective:              Patient is a 68 y.o. female who is being seen as a returning pt for diabetic pedal exam and evaluation of her interdigital maceration. Patient states she has close follow-up with her PCP Arvind Rajput MD). She states her last office visit with her PCP was 04/21/2023. She describes her diabetes  as being under control. She denies any overt pedal pain. She denies any recent pedal trauma. She denies any systemic signs of infection but does state she has very sweaty feet. She has been intermittently compliant with changing her socks and utilizing the foot powder. She denies any other lower extremity  complaints           Past Medical History:   Diagnosis Date    Diabetes ([address])     GERD (gastroesophageal reflux disease)     HTN (hypertension)     Hypercholesteremia     Morbid obesity (HCC)     Osteoarthritis     Sciatica     Sleep apnea     Vitamin D deficiency         Current Outpatient Medications:     Semaglutide,0.25 or 0.5MG/DOS, (OZEMPIC, 0.25 OR 0.5 MG/DOSE,) 2 MG/1.5ML SOPN, Inject 0.25 mg into the skin every 7 days for 30 days, THEN 0.5 mg every 7 days. , Disp: 9 mL, Rfl: 0    metFORMIN (GLUCOPHAGE) 500 MG tablet, Take 1 tablet by mouth Daily with supper, Disp: 90 tablet, Rfl: 1    lisinopril-hydroCHLOROthiazide (PRINZIDE;ZESTORETIC) 20-12.5 MG per tablet, Take 1 tablet by mouth every morning, Disp: 90 tablet, Rfl: 1    pregabalin (LYRICA) 150 MG capsule, TAKE 1 CAPSULE BY MOUTH TWICE DAILY MAX  DAILY  AMOUNT  IS  300  MG, Disp: 180 capsule, Rfl: 1    aspirin 81 MG EC tablet, Take by mouth daily, Disp: , Rfl:     azaTHIOprine (IMURAN) 50 MG tablet, Take 1 tablet by mouth 2 times daily, Disp: , Rfl:     cetirizine (ZYRTEC) 10 MG tablet, Take 1 tablet by mouth daily, Disp: , Rfl:     vitamin D 25 MCG (1000 UT) CAPS, Take by mouth daily, Disp: , Rfl:     diclofenac sodium (VOLTAREN) 1 % GEL, Apply topically 4 times daily, Disp: , Rfl:

## 2023-09-25 RX ORDER — SEMAGLUTIDE 1.34 MG/ML
1 INJECTION, SOLUTION SUBCUTANEOUS
Qty: 9 ML | Refills: 1 | Status: SHIPPED | OUTPATIENT
Start: 2023-09-25 | End: 2024-03-23

## 2023-11-14 DIAGNOSIS — M48.061 SPINAL STENOSIS, LUMBAR REGION WITHOUT NEUROGENIC CLAUDICATION: ICD-10-CM

## 2023-11-15 RX ORDER — PREGABALIN 150 MG/1
CAPSULE ORAL
Qty: 180 CAPSULE | Refills: 1 | Status: SHIPPED | OUTPATIENT
Start: 2023-11-15 | End: 2024-02-13

## 2023-11-22 ENCOUNTER — OFFICE VISIT (OUTPATIENT)
Facility: CLINIC | Age: 78
End: 2023-11-22

## 2023-11-22 VITALS
HEART RATE: 72 BPM | RESPIRATION RATE: 20 BRPM | BODY MASS INDEX: 45.54 KG/M2 | WEIGHT: 241.2 LBS | TEMPERATURE: 97.9 F | OXYGEN SATURATION: 95 % | HEIGHT: 61 IN | DIASTOLIC BLOOD PRESSURE: 64 MMHG | SYSTOLIC BLOOD PRESSURE: 138 MMHG

## 2023-11-22 DIAGNOSIS — E78.00 PURE HYPERCHOLESTEROLEMIA: ICD-10-CM

## 2023-11-22 DIAGNOSIS — E55.9 VITAMIN D DEFICIENCY: ICD-10-CM

## 2023-11-22 DIAGNOSIS — M48.061 SPINAL STENOSIS OF LUMBAR REGION WITHOUT NEUROGENIC CLAUDICATION: ICD-10-CM

## 2023-11-22 DIAGNOSIS — Z78.9 STATIN INTOLERANCE: ICD-10-CM

## 2023-11-22 DIAGNOSIS — Z00.00 MEDICARE ANNUAL WELLNESS VISIT, SUBSEQUENT: ICD-10-CM

## 2023-11-22 DIAGNOSIS — J30.9 ALLERGIC RHINITIS, UNSPECIFIED SEASONALITY, UNSPECIFIED TRIGGER: ICD-10-CM

## 2023-11-22 DIAGNOSIS — M15.9 PRIMARY OSTEOARTHRITIS INVOLVING MULTIPLE JOINTS: ICD-10-CM

## 2023-11-22 DIAGNOSIS — M35.3 POLYMYALGIA RHEUMATICA (HCC): ICD-10-CM

## 2023-11-22 DIAGNOSIS — G47.30 SLEEP APNEA, UNSPECIFIED TYPE: ICD-10-CM

## 2023-11-22 DIAGNOSIS — E11.9 CONTROLLED TYPE 2 DIABETES MELLITUS WITHOUT COMPLICATION, WITHOUT LONG-TERM CURRENT USE OF INSULIN (HCC): ICD-10-CM

## 2023-11-22 DIAGNOSIS — I10 ESSENTIAL HYPERTENSION: ICD-10-CM

## 2023-11-22 LAB — HBA1C MFR BLD: 6.1 %

## 2023-11-22 ASSESSMENT — PATIENT HEALTH QUESTIONNAIRE - PHQ9
2. FEELING DOWN, DEPRESSED OR HOPELESS: 0
SUM OF ALL RESPONSES TO PHQ QUESTIONS 1-9: 0
1. LITTLE INTEREST OR PLEASURE IN DOING THINGS: 0
SUM OF ALL RESPONSES TO PHQ9 QUESTIONS 1 & 2: 0
SUM OF ALL RESPONSES TO PHQ QUESTIONS 1-9: 0

## 2023-11-22 ASSESSMENT — ENCOUNTER SYMPTOMS
ALLERGIC/IMMUNOLOGIC NEGATIVE: 1
EYES NEGATIVE: 1
RESPIRATORY NEGATIVE: 1
GASTROINTESTINAL NEGATIVE: 1

## 2023-11-22 NOTE — PROGRESS NOTES
Elias Alcaraz (: 1945) is a 66 y.o. female, Established patient patient, here for evaluation of the following chief complaint(s):  Medicare AWV and Follow-up Chronic Condition         ASSESSMENT/PLAN:  Below is the assessment and plan developed based on review of pertinent history, physical exam, labs, studies, and medications. 1. Medicare annual wellness visit, subsequent  2. Essential hypertension  3. Controlled type 2 diabetes mellitus without complication, without long-term current use of insulin ([facility])  -     AMB POC HEMOGLOBIN A1C  4. Spinal stenosis of lumbar region without neurogenic claudication  5. BMI 40.0-44.9, adult ([address])  6. Polymyalgia rheumatica ([address])  7. Pure hypercholesterolemia  8. Statin intolerance  9. Primary osteoarthritis involving multiple joints  10. Sleep apnea, unspecified type  11. Vitamin D deficiency  12. Allergic rhinitis, unspecified seasonality, unspecified trigger    Ms. Manuel Barnes is a very pleasant person came for combined visit    Hypertension well-controlled after resting, continue lisinopril/HCTZ 20/12.5 mg once a day morning. Diet low in sodium. Type 2 diabetes mellitus well controlled hemoglobin A1c improved from 6.1% from 6.6% in August and it is non-insulin-dependent. Patient is complemented. She is taking metformin 500 mg once a day with supper. She is getting Ozempic she says she spent high because that she was trying to afford but now she cannot afford. [employer] is working so well for her unfortunately she cannot afford that much high price so I changed to Trulicity and I reassured her that we will try to do prior Auth. I tried to change to Trulicity if it is not covered we will do prior Auth for any of the GLP-1 receptor agonist.  She agreed. She cannot take statin due to muscle pain she could not tolerate ezetimibe. Continue low-dose aspirin diet low in sugar starch. She finished diabetic education classes.   She follows ophthalmologist.  She is

## 2023-11-23 PROBLEM — Z00.00 MEDICARE ANNUAL WELLNESS VISIT, SUBSEQUENT: Status: ACTIVE | Noted: 2023-11-23

## 2023-12-12 RX ORDER — SEMAGLUTIDE 1.34 MG/ML
1.5 INJECTION, SOLUTION SUBCUTANEOUS
Qty: 4 ADJUSTABLE DOSE PRE-FILLED PEN SYRINGE | Refills: 2 | Status: SHIPPED | OUTPATIENT
Start: 2023-12-12

## 2023-12-12 NOTE — TELEPHONE ENCOUNTER
Per patient was on Ozempic but it was unavailable, Trulicity is expensive but she did get a 90 day supply. Would like rx for Ozempic sent in and to continue Ozempic once she completes the trulicity she already purchased.

## 2023-12-21 ENCOUNTER — OFFICE VISIT (OUTPATIENT)
Age: 78
End: 2023-12-21
Payer: MEDICARE

## 2023-12-21 VITALS
SYSTOLIC BLOOD PRESSURE: 162 MMHG | OXYGEN SATURATION: 99 % | WEIGHT: 241 LBS | DIASTOLIC BLOOD PRESSURE: 62 MMHG | TEMPERATURE: 96.8 F | RESPIRATION RATE: 16 BRPM | HEART RATE: 73 BPM | HEIGHT: 61 IN | BODY MASS INDEX: 45.5 KG/M2

## 2023-12-21 DIAGNOSIS — L74.519 FOCAL HYPERHIDROSIS: ICD-10-CM

## 2023-12-21 DIAGNOSIS — E11.9 CONTROLLED TYPE 2 DIABETES MELLITUS WITHOUT COMPLICATION, WITHOUT LONG-TERM CURRENT USE OF INSULIN (HCC): Primary | ICD-10-CM

## 2023-12-21 DIAGNOSIS — L60.3 ONYCHODYSTROPHY: ICD-10-CM

## 2023-12-21 PROCEDURE — 3077F SYST BP >= 140 MM HG: CPT | Performed by: PODIATRIST

## 2023-12-21 PROCEDURE — 1036F TOBACCO NON-USER: CPT | Performed by: PODIATRIST

## 2023-12-21 PROCEDURE — 3078F DIAST BP <80 MM HG: CPT | Performed by: PODIATRIST

## 2023-12-21 PROCEDURE — 99213 OFFICE O/P EST LOW 20 MIN: CPT | Performed by: PODIATRIST

## 2023-12-21 PROCEDURE — 1123F ACP DISCUSS/DSCN MKR DOCD: CPT | Performed by: PODIATRIST

## 2023-12-21 PROCEDURE — G8427 DOCREV CUR MEDS BY ELIG CLIN: HCPCS | Performed by: PODIATRIST

## 2023-12-21 PROCEDURE — 11721 DEBRIDE NAIL 6 OR MORE: CPT | Performed by: PODIATRIST

## 2023-12-21 PROCEDURE — G8484 FLU IMMUNIZE NO ADMIN: HCPCS | Performed by: PODIATRIST

## 2023-12-21 PROCEDURE — G8399 PT W/DXA RESULTS DOCUMENT: HCPCS | Performed by: PODIATRIST

## 2023-12-21 PROCEDURE — G8417 CALC BMI ABV UP PARAM F/U: HCPCS | Performed by: PODIATRIST

## 2023-12-21 PROCEDURE — 1090F PRES/ABSN URINE INCON ASSESS: CPT | Performed by: PODIATRIST

## 2023-12-23 PROBLEM — Z00.00 MEDICARE ANNUAL WELLNESS VISIT, SUBSEQUENT: Status: RESOLVED | Noted: 2023-11-23 | Resolved: 2023-12-23

## 2024-01-04 NOTE — PROGRESS NOTES
Chief Complaint   Patient presents with    Diabetes     Diabetic foot care          BP (!) 162/62 (Site: Left Upper Arm, Position: Sitting)   Pulse 73   Temp 96.8 °F (36 °C) (Temporal)   Resp 16   Ht 1.549 m (5' 1\")   Wt 109.3 kg (241 lb)   LMP  (LMP Unknown)   SpO2 99%   BMI 45.54 kg/m²         1. Have you been to the ER, urgent care clinic since your last visit?  Hospitalized since your last visit?No    2. Have you seen or consulted any other health care providers outside of the [facility] since your last visit?  Include any pap smears or colon screening. No    
[phone]    [facility]   [address]  [place] [address]  O: [phone]  F: [phone]    [facility]  [address]  [place] [address]  O: [phone]  F: [phone]    * Available via [employer] 24/7

## 2024-01-05 RX ORDER — SEMAGLUTIDE 1.34 MG/ML
1.5 INJECTION, SOLUTION SUBCUTANEOUS
Qty: 4 ADJUSTABLE DOSE PRE-FILLED PEN SYRINGE | Refills: 5 | Status: SHIPPED | OUTPATIENT
Start: 2024-01-05

## 2024-01-05 NOTE — TELEPHONE ENCOUNTER
This was sent on 12/12/23 but pharmacy is saying they didn't receive it. Can you send again? She is do to take on Monday.   Thank you.

## 2024-01-24 ENCOUNTER — TELEPHONE (OUTPATIENT)
Facility: CLINIC | Age: 79
End: 2024-01-24

## 2024-01-24 NOTE — TELEPHONE ENCOUNTER
Left message for patient to call.  Papers received from [employer]. There are 4 pages she needs to complete and sign. Will leave up front for her. Will complete forms once she completes her part and signs forms.

## 2024-02-15 ENCOUNTER — TELEPHONE (OUTPATIENT)
Facility: CLINIC | Age: 79
End: 2024-02-15

## 2024-02-15 RX ORDER — SEMAGLUTIDE 1.34 MG/ML
1.5 INJECTION, SOLUTION SUBCUTANEOUS
Qty: 4 ADJUSTABLE DOSE PRE-FILLED PEN SYRINGE | Refills: 2 | Status: SHIPPED | OUTPATIENT
Start: 2024-02-15 | End: 2024-02-16 | Stop reason: DRUGHIGH

## 2024-02-15 NOTE — TELEPHONE ENCOUNTER
Patient needs Ozempic sent to pharmacy. She stated pharmacy will not fill because it is entered wrong. She stated it said 1.5 but it does not come in that dose.   She has been out for 3 weeks. She thinks she may have to start from beginning dose again.

## 2024-02-16 RX ORDER — SEMAGLUTIDE 1.34 MG/ML
1 INJECTION, SOLUTION SUBCUTANEOUS
Qty: 9 ML | Refills: 1 | Status: SHIPPED | OUTPATIENT
Start: 2024-02-16 | End: 2024-08-14

## 2024-02-27 ENCOUNTER — OFFICE VISIT (OUTPATIENT)
Facility: CLINIC | Age: 79
End: 2024-02-27
Payer: MEDICARE

## 2024-02-27 VITALS
HEIGHT: 61 IN | BODY MASS INDEX: 46.82 KG/M2 | TEMPERATURE: 97.9 F | DIASTOLIC BLOOD PRESSURE: 60 MMHG | SYSTOLIC BLOOD PRESSURE: 118 MMHG | HEART RATE: 72 BPM | OXYGEN SATURATION: 98 % | WEIGHT: 248 LBS

## 2024-02-27 DIAGNOSIS — I10 ESSENTIAL HYPERTENSION: ICD-10-CM

## 2024-02-27 DIAGNOSIS — M48.061 SPINAL STENOSIS OF LUMBAR REGION WITHOUT NEUROGENIC CLAUDICATION: ICD-10-CM

## 2024-02-27 DIAGNOSIS — M15.9 PRIMARY OSTEOARTHRITIS INVOLVING MULTIPLE JOINTS: ICD-10-CM

## 2024-02-27 DIAGNOSIS — D63.8 ANEMIA IN OTHER CHRONIC DISEASES CLASSIFIED ELSEWHERE: ICD-10-CM

## 2024-02-27 DIAGNOSIS — Z78.9 STATIN INTOLERANCE: ICD-10-CM

## 2024-02-27 DIAGNOSIS — E78.00 PURE HYPERCHOLESTEROLEMIA: ICD-10-CM

## 2024-02-27 DIAGNOSIS — E11.9 CONTROLLED TYPE 2 DIABETES MELLITUS WITHOUT COMPLICATION, WITHOUT LONG-TERM CURRENT USE OF INSULIN (HCC): ICD-10-CM

## 2024-02-27 DIAGNOSIS — E55.9 VITAMIN D DEFICIENCY: ICD-10-CM

## 2024-02-27 DIAGNOSIS — G47.30 SLEEP APNEA, UNSPECIFIED TYPE: ICD-10-CM

## 2024-02-27 DIAGNOSIS — M35.3 POLYMYALGIA RHEUMATICA (HCC): ICD-10-CM

## 2024-02-27 LAB — HBA1C MFR BLD: 6.2 %

## 2024-02-27 PROCEDURE — 1090F PRES/ABSN URINE INCON ASSESS: CPT | Performed by: INTERNAL MEDICINE

## 2024-02-27 PROCEDURE — 99214 OFFICE O/P EST MOD 30 MIN: CPT | Performed by: INTERNAL MEDICINE

## 2024-02-27 PROCEDURE — G8399 PT W/DXA RESULTS DOCUMENT: HCPCS | Performed by: INTERNAL MEDICINE

## 2024-02-27 PROCEDURE — G8427 DOCREV CUR MEDS BY ELIG CLIN: HCPCS | Performed by: INTERNAL MEDICINE

## 2024-02-27 PROCEDURE — G8417 CALC BMI ABV UP PARAM F/U: HCPCS | Performed by: INTERNAL MEDICINE

## 2024-02-27 PROCEDURE — G8484 FLU IMMUNIZE NO ADMIN: HCPCS | Performed by: INTERNAL MEDICINE

## 2024-02-27 PROCEDURE — 3078F DIAST BP <80 MM HG: CPT | Performed by: INTERNAL MEDICINE

## 2024-02-27 PROCEDURE — 1123F ACP DISCUSS/DSCN MKR DOCD: CPT | Performed by: INTERNAL MEDICINE

## 2024-02-27 PROCEDURE — 3074F SYST BP LT 130 MM HG: CPT | Performed by: INTERNAL MEDICINE

## 2024-02-27 PROCEDURE — 1036F TOBACCO NON-USER: CPT | Performed by: INTERNAL MEDICINE

## 2024-02-27 PROCEDURE — 83036 HEMOGLOBIN GLYCOSYLATED A1C: CPT | Performed by: INTERNAL MEDICINE

## 2024-02-27 RX ORDER — LANOLIN ALCOHOL/MO/W.PET/CERES
1000 CREAM (GRAM) TOPICAL DAILY
COMMUNITY

## 2024-02-27 ASSESSMENT — ENCOUNTER SYMPTOMS
RESPIRATORY NEGATIVE: 1
GASTROINTESTINAL NEGATIVE: 1
ALLERGIC/IMMUNOLOGIC NEGATIVE: 1
EYES NEGATIVE: 1

## 2024-02-27 NOTE — PROGRESS NOTES
.  Chief Complaint   Patient presents with    Follow-up Chronic Condition    Hypertension    Diabetes       .  \"Have you been to the ER, urgent care clinic since your last visit?  Hospitalized since your last visit?\"    NO    “Have you seen or consulted any other health care providers outside of [facility] since your last visit?”    YES - When: approximately 2  weeks ago.  Where and Why: [facility] for follow up and Dr. Amanda Deshpande for follow up 1 month ago. .    ./88 (Site: Left Upper Arm, Position: Sitting, Cuff Size: Large Adult)   Pulse 78   Temp 97.9 °F (36.6 °C) (Oral)   Ht 1.549 m (5' 1\")   Wt 112.5 kg (248 lb)   LMP  (LMP Unknown)   SpO2 98%   BMI 46.86 kg/m²             
(FLONASE) 50 MCG/ACT nasal spray Use 2 spray(s) in each nostril once daily       No current facility-administered medications for this visit.      Past Medical History:   Diagnosis Date    Diabetes (HCC)     GERD (gastroesophageal reflux disease)     HTN (hypertension)     Hypercholesteremia     Morbid obesity (HCC)     Osteoarthritis     Sciatica     Sleep apnea     Vitamin D deficiency      Past Surgical History:   Procedure Laterality Date    CARPAL TUNNEL RELEASE Right 09/15/2021    CARPAL TUNNEL RELEASE Left 07/15/2021    COLONOSCOPY      UZMA STEROTACTIC LOC BREAST BIOPSY RIGHT Right 03/01/2021    UZMA STEROTACTIC LOC BREAST BIOPSY RIGHT 3/1/2021 [facility]     Family History   Problem Relation Age of Onset    Heart Disease Mother     Diabetes Mother     Stroke Mother     Heart Disease Father     Stroke Father     Lupus Sister      Social History     Tobacco Use   Smoking Status Never   Smokeless Tobacco Never     Review of Systems   Constitutional: Negative.    HENT: Negative.     Eyes: Negative.    Respiratory: Negative.     Cardiovascular: Negative.    Gastrointestinal: Negative.    Endocrine: Negative.    Genitourinary: Negative.    Musculoskeletal: Negative.         Having DJD and history of lumbar spinal stenosis.  Also follows rheumatologist for PMR   Skin: Negative.    Allergic/Immunologic: Negative.    Neurological: Negative.    Hematological: Negative.    Psychiatric/Behavioral: Negative.       Vitals:    02/27/24 1340 02/27/24 1401   BP: 138/88 118/60   Site: Left Upper Arm Left Upper Arm   Position: Sitting Sitting   Cuff Size: Large Adult Large Adult   Pulse: 78 72   Temp: 97.9 °F (36.6 °C)    TempSrc: Oral    SpO2: 98%    Weight: 112.5 kg (248 lb)    Height: 1.549 m (5' 1\")           Physical Exam  Vitals and nursing note reviewed.   Constitutional:       General: She is not in acute distress.     Appearance: Normal appearance. She is not ill-appearing, toxic-appearing or diaphoretic.

## 2024-03-15 ENCOUNTER — TELEPHONE (OUTPATIENT)
Age: 79
End: 2024-03-15

## 2024-03-15 NOTE — TELEPHONE ENCOUNTER
03/15/24 4:42PM Called the patient to inform we need to cancel her appt and get her r/s as Dr. SIMON is not able to see her on 03/18/24 at 1:15PM due to the schedule being over booked--unable to reach the patient/[facility] for patient informing we will need to call her back to r/s her appt.

## 2024-04-11 ENCOUNTER — OFFICE VISIT (OUTPATIENT)
Age: 79
End: 2024-04-11
Payer: MEDICARE

## 2024-04-11 VITALS
BODY MASS INDEX: 45.09 KG/M2 | HEART RATE: 72 BPM | WEIGHT: 238.8 LBS | HEIGHT: 61 IN | SYSTOLIC BLOOD PRESSURE: 151 MMHG | OXYGEN SATURATION: 98 % | TEMPERATURE: 97.3 F | DIASTOLIC BLOOD PRESSURE: 56 MMHG

## 2024-04-11 DIAGNOSIS — E11.9 CONTROLLED TYPE 2 DIABETES MELLITUS WITHOUT COMPLICATION, WITHOUT LONG-TERM CURRENT USE OF INSULIN (HCC): Primary | ICD-10-CM

## 2024-04-11 DIAGNOSIS — L60.3 ONYCHODYSTROPHY: ICD-10-CM

## 2024-04-11 DIAGNOSIS — L74.519 FOCAL HYPERHIDROSIS: ICD-10-CM

## 2024-04-11 PROCEDURE — 3078F DIAST BP <80 MM HG: CPT | Performed by: PODIATRIST

## 2024-04-11 PROCEDURE — 1123F ACP DISCUSS/DSCN MKR DOCD: CPT | Performed by: PODIATRIST

## 2024-04-11 PROCEDURE — 11721 DEBRIDE NAIL 6 OR MORE: CPT | Performed by: PODIATRIST

## 2024-04-11 PROCEDURE — 1036F TOBACCO NON-USER: CPT | Performed by: PODIATRIST

## 2024-04-11 PROCEDURE — G8399 PT W/DXA RESULTS DOCUMENT: HCPCS | Performed by: PODIATRIST

## 2024-04-11 PROCEDURE — G8427 DOCREV CUR MEDS BY ELIG CLIN: HCPCS | Performed by: PODIATRIST

## 2024-04-11 PROCEDURE — 99214 OFFICE O/P EST MOD 30 MIN: CPT | Performed by: PODIATRIST

## 2024-04-11 PROCEDURE — 1090F PRES/ABSN URINE INCON ASSESS: CPT | Performed by: PODIATRIST

## 2024-04-11 PROCEDURE — G8417 CALC BMI ABV UP PARAM F/U: HCPCS | Performed by: PODIATRIST

## 2024-04-11 PROCEDURE — 3077F SYST BP >= 140 MM HG: CPT | Performed by: PODIATRIST

## 2024-04-11 NOTE — PROGRESS NOTES
Chief Complaint   Patient presents with    Follow-up    BP (!) 151/56 (Site: Left Upper Arm, Position: Sitting, Cuff Size: Large Adult) Comment: recheck bp  Pulse 72   Temp 97.3 °F (36.3 °C) (Temporal)   Ht 1.549 m (5' 1\")   Wt 108.3 kg (238 lb 12.8 oz)   LMP  (LMP Unknown)   SpO2 98%   BMI 45.12 kg/m²  1. Have you been to the ER, urgent care clinic since your last visit?  Hospitalized since your last visit?No    2. Have you seen or consulted any other health care providers outside of the [facility] since your last visit?  Include any pap smears or colon screening. No

## 2024-04-16 RX ORDER — LISINOPRIL AND HYDROCHLOROTHIAZIDE 20; 12.5 MG/1; MG/1
1 TABLET ORAL EVERY MORNING
Qty: 90 TABLET | Refills: 2 | Status: SHIPPED | OUTPATIENT
Start: 2024-04-16

## 2024-04-30 NOTE — PROGRESS NOTES
[facility] PODIATRY & FOOT SURGERY          Subjective:             Patient is a 79 y.o. female who is being seen as a returning pt for diabetic pedal exam and evaluation of her interdigital maceration.  Patient states she has close follow-up with her PCP (Zari Pacheco MD). She states her last office visit with her PCP was 02/27/2024.  She describes her diabetes  as being under control.  She denies any overt pedal pain.  She denies any recent pedal trauma.  She denies any systemic signs of infection but does state she has very sweaty feet. She has been intermittently compliant with changing her socks and utilizing the foot powder.  She denies any other lower extremity  complaints           Past Medical History:   Diagnosis Date    Diabetes (HCC)     GERD (gastroesophageal reflux disease)     HTN (hypertension)     Hypercholesteremia     Morbid obesity (HCC)     Osteoarthritis     Sciatica     Sleep apnea     Vitamin D deficiency         Current Outpatient Medications:     Semaglutide,0.25 or 0.5MG/DOS, (OZEMPIC, 0.25 OR 0.5 MG/DOSE,) 2 MG/1.5ML SOPN, Inject 1.5 mg into the skin every 7 days, Disp: 4 Adjustable Dose Pre-filled Pen Syringe, Rfl: 2    dulaglutide (TRULICITY) 1.5 MG/0.5ML SC injection, Inject 0.5 mLs into the skin every 7 days, Disp: 4 Adjustable Dose Pre-filled Pen Syringe, Rfl: 3    pregabalin (LYRICA) 150 MG capsule, TAKE 1 CAPSULE BY MOUTH TWICE DAILY MAX DAILY AMOUNT  MG, Disp: 180 capsule, Rfl: 1    metFORMIN (GLUCOPHAGE) 500 MG tablet, Take 1 tablet by mouth Daily with supper, Disp: 90 tablet, Rfl: 1    lisinopril-hydroCHLOROthiazide (PRINZIDE;ZESTORETIC) 20-12.5 MG per tablet, Take 1 tablet by mouth every morning, Disp: 90 tablet, Rfl: 1    aspirin 81 MG EC tablet, Take by mouth daily, Disp: , Rfl:     azaTHIOprine (IMURAN) 50 MG tablet, Take 1 tablet by mouth 2 times daily, Disp: , Rfl:     cetirizine (ZYRTEC) 10 MG tablet, Take 1 tablet by mouth daily,

## 2024-05-11 DIAGNOSIS — M48.061 SPINAL STENOSIS, LUMBAR REGION WITHOUT NEUROGENIC CLAUDICATION: ICD-10-CM

## 2024-05-13 RX ORDER — PREGABALIN 150 MG/1
CAPSULE ORAL
Qty: 180 CAPSULE | Refills: 1 | Status: SHIPPED | OUTPATIENT
Start: 2024-05-13 | End: 2024-11-09

## 2024-05-19 PROBLEM — M15.9 OSTEOARTHRITIS OF MULTIPLE JOINTS: Status: RESOLVED | Noted: 2020-07-23 | Resolved: 2024-05-19

## 2024-06-05 ENCOUNTER — TELEPHONE (OUTPATIENT)
Facility: CLINIC | Age: 79
End: 2024-06-05

## 2024-06-05 NOTE — TELEPHONE ENCOUNTER
----- Message from Stephy Biggs sent at 5/23/2024  9:44 AM EDT -----  Subject: Appointment Request    Reason for Call: Established Patient Appointment needed: Routine Existing   Condition Follow Up    QUESTIONS    Reason for appointment request? No appointments available during search     Additional Information for Provider? please call to r/s appt that was   missed on 5/22/24 with Zari  ---------------------------------------------------------------------------  --------------  CALL BACK INFO  [phone]; OK to leave message on voicemail  ---------------------------------------------------------------------------  --------------  SCRIPT ANSWERS

## 2024-06-07 ENCOUNTER — OFFICE VISIT (OUTPATIENT)
Facility: CLINIC | Age: 79
End: 2024-06-07

## 2024-06-07 VITALS
DIASTOLIC BLOOD PRESSURE: 70 MMHG | SYSTOLIC BLOOD PRESSURE: 138 MMHG | HEIGHT: 61 IN | RESPIRATION RATE: 18 BRPM | BODY MASS INDEX: 43.05 KG/M2 | OXYGEN SATURATION: 98 % | TEMPERATURE: 98 F | WEIGHT: 228 LBS | HEART RATE: 72 BPM

## 2024-06-07 DIAGNOSIS — I10 ESSENTIAL HYPERTENSION: Primary | ICD-10-CM

## 2024-06-07 DIAGNOSIS — E78.00 PURE HYPERCHOLESTEROLEMIA: ICD-10-CM

## 2024-06-07 DIAGNOSIS — J01.10 ACUTE NON-RECURRENT FRONTAL SINUSITIS: ICD-10-CM

## 2024-06-07 DIAGNOSIS — M48.061 SPINAL STENOSIS OF LUMBAR REGION WITHOUT NEUROGENIC CLAUDICATION: ICD-10-CM

## 2024-06-07 DIAGNOSIS — Z78.9 STATIN INTOLERANCE: ICD-10-CM

## 2024-06-07 DIAGNOSIS — M15.9 PRIMARY OSTEOARTHRITIS INVOLVING MULTIPLE JOINTS: ICD-10-CM

## 2024-06-07 DIAGNOSIS — J30.1 SEASONAL ALLERGIC RHINITIS DUE TO POLLEN: ICD-10-CM

## 2024-06-07 DIAGNOSIS — E11.9 CONTROLLED TYPE 2 DIABETES MELLITUS WITHOUT COMPLICATION, WITHOUT LONG-TERM CURRENT USE OF INSULIN (HCC): ICD-10-CM

## 2024-06-07 DIAGNOSIS — M35.3 POLYMYALGIA RHEUMATICA (HCC): ICD-10-CM

## 2024-06-07 DIAGNOSIS — G47.30 SLEEP APNEA, UNSPECIFIED TYPE: ICD-10-CM

## 2024-06-07 LAB
GLUCOSE, POC: 138 MG/DL
HBA1C MFR BLD: 5.9 %

## 2024-06-07 RX ORDER — FLUTICASONE PROPIONATE 50 MCG
2 SPRAY, SUSPENSION (ML) NASAL DAILY
Qty: 16 G | Refills: 4 | Status: SHIPPED | OUTPATIENT
Start: 2024-06-07

## 2024-06-07 RX ORDER — AMOXICILLIN AND CLAVULANATE POTASSIUM 875; 125 MG/1; MG/1
1 TABLET, FILM COATED ORAL 2 TIMES DAILY
Qty: 14 TABLET | Refills: 0 | Status: SHIPPED | OUTPATIENT
Start: 2024-06-07 | End: 2024-06-14

## 2024-06-07 RX ORDER — SEMAGLUTIDE 1.34 MG/ML
1 INJECTION, SOLUTION SUBCUTANEOUS
COMMUNITY
Start: 2024-05-12 | End: 2024-06-07

## 2024-06-07 RX ORDER — CETIRIZINE HYDROCHLORIDE 10 MG/1
10 TABLET ORAL DAILY
Qty: 90 TABLET | Refills: 1 | Status: SHIPPED | OUTPATIENT
Start: 2024-06-07

## 2024-06-07 RX ORDER — SEMAGLUTIDE 1.34 MG/ML
1 INJECTION, SOLUTION SUBCUTANEOUS
Qty: 9 ML | Refills: 2 | Status: SHIPPED | OUTPATIENT
Start: 2024-06-07 | End: 2024-12-04

## 2024-06-07 ASSESSMENT — PATIENT HEALTH QUESTIONNAIRE - PHQ9
1. LITTLE INTEREST OR PLEASURE IN DOING THINGS: NOT AT ALL
SUM OF ALL RESPONSES TO PHQ9 QUESTIONS 1 & 2: 0
SUM OF ALL RESPONSES TO PHQ QUESTIONS 1-9: 0
2. FEELING DOWN, DEPRESSED OR HOPELESS: NOT AT ALL

## 2024-06-07 ASSESSMENT — ENCOUNTER SYMPTOMS
GASTROINTESTINAL NEGATIVE: 1
EYES NEGATIVE: 1
RESPIRATORY NEGATIVE: 1
ALLERGIC/IMMUNOLOGIC NEGATIVE: 1
SINUS PRESSURE: 1

## 2024-06-07 NOTE — PROGRESS NOTES
Chief Complaint   Patient presents with    Follow-up Chronic Condition     BP (!) 142/60 (Site: Left Upper Arm, Position: Sitting)   Pulse 71   Temp 98 °F (36.7 °C) (Oral)   Resp 18   Ht 1.549 m (5' 0.98\")   Wt 103.4 kg (228 lb)   LMP  (LMP Unknown)   SpO2 98%   BMI 43.10 kg/m²     \"Have you been to the ER, urgent care clinic since your last visit?  Hospitalized since your last visit?\"    NO    “Have you seen or consulted any other health care providers outside of [facility] since your last visit?”    Yes rheumatologist Dr Forte             Click Here for Release of Records Request  
Positive for congestion and sinus pressure.    Eyes: Negative.    Respiratory: Negative.     Cardiovascular: Negative.    Gastrointestinal: Negative.    Endocrine: Negative.    Genitourinary: Negative.    Musculoskeletal: Negative.    Skin: Negative.    Allergic/Immunologic: Negative.    Neurological: Negative.    Hematological: Negative.    Psychiatric/Behavioral: Negative.         Vitals:    06/07/24 1112 06/07/24 1116 06/07/24 1133   BP: (!) 162/78 (!) 142/60 138/70   Site: Right Upper Arm Left Upper Arm Right Upper Arm   Position: Sitting Sitting Sitting   Cuff Size:   Large Adult   Pulse: 71  72   Resp: 18     Temp: 98 °F (36.7 °C)     TempSrc: Oral     SpO2: 98%     Weight: 103.4 kg (228 lb)     Height: 1.549 m (5' 0.98\")            Physical Exam  Vitals and nursing note reviewed.   Constitutional:       General: She is not in acute distress.     Appearance: Normal appearance. She is not ill-appearing, toxic-appearing or diaphoretic.      Comments: 43.10   HENT:      Right Ear: External ear normal. There is no impacted cerumen.      Left Ear: External ear normal. There is no impacted cerumen.      Nose: Nose normal. No congestion.      Mouth/Throat:      Mouth: Mucous membranes are moist.      Pharynx: No oropharyngeal exudate or posterior oropharyngeal erythema.   Eyes:      General: No scleral icterus.     Conjunctiva/sclera: Conjunctivae normal.      Pupils: Pupils are equal, round, and reactive to light.   Neck:      Vascular: No carotid bruit.   Cardiovascular:      Rate and Rhythm: Normal rate and regular rhythm.      Pulses: Normal pulses.      Heart sounds: Normal heart sounds.   Pulmonary:      Effort: Pulmonary effort is normal.      Breath sounds: Normal breath sounds. No wheezing or rhonchi.   Abdominal:      General: Bowel sounds are normal. There is no distension.      Palpations: There is no mass.      Tenderness: There is no abdominal tenderness. There is no guarding or rebound.

## 2024-07-15 ENCOUNTER — OFFICE VISIT (OUTPATIENT)
Age: 79
End: 2024-07-15

## 2024-07-15 VITALS
DIASTOLIC BLOOD PRESSURE: 57 MMHG | WEIGHT: 229.8 LBS | BODY MASS INDEX: 43.39 KG/M2 | HEIGHT: 61 IN | RESPIRATION RATE: 20 BRPM | HEART RATE: 69 BPM | OXYGEN SATURATION: 98 % | SYSTOLIC BLOOD PRESSURE: 145 MMHG | TEMPERATURE: 98.2 F

## 2024-07-15 DIAGNOSIS — E11.9 CONTROLLED TYPE 2 DIABETES MELLITUS WITHOUT COMPLICATION, WITHOUT LONG-TERM CURRENT USE OF INSULIN (HCC): Primary | ICD-10-CM

## 2024-07-15 DIAGNOSIS — L60.3 ONYCHODYSTROPHY: ICD-10-CM

## 2024-07-15 DIAGNOSIS — L74.519 FOCAL HYPERHIDROSIS: ICD-10-CM

## 2024-07-15 ASSESSMENT — PATIENT HEALTH QUESTIONNAIRE - PHQ9
SUM OF ALL RESPONSES TO PHQ QUESTIONS 1-9: 0
SUM OF ALL RESPONSES TO PHQ QUESTIONS 1-9: 0
SUM OF ALL RESPONSES TO PHQ9 QUESTIONS 1 & 2: 0
2. FEELING DOWN, DEPRESSED OR HOPELESS: NOT AT ALL
SUM OF ALL RESPONSES TO PHQ QUESTIONS 1-9: 0
SUM OF ALL RESPONSES TO PHQ QUESTIONS 1-9: 0
1. LITTLE INTEREST OR PLEASURE IN DOING THINGS: NOT AT ALL

## 2024-07-15 NOTE — PROGRESS NOTES
Identified pt with two pt identifiers(name and ). Reviewed record in preparation for visit and have obtained necessary documentation. All patient medications has been reviewed.  Chief Complaint   Patient presents with    Controlled type 2 diabetes mellitus without complication, w       Vitals:    07/15/24 1338   BP: (!) 145/57   Pulse: 69   Resp: 20   Temp: 98.2 °F (36.8 °C)   SpO2: 98%                   Coordination of Care Questionnaire:   1) Have you been to an emergency room, urgent care, or hospitalized since your last visit?   no       2. Have seen or consulted any other health care provider since your last visit? no        Patient is accompanied by self I have received verbal consent from Chetna Bowling to discuss any/all medical information while they are present in the room.

## 2024-07-19 NOTE — PROGRESS NOTES
[facility] PODIATRY & FOOT SURGERY          Subjective:             Patient is a 79 y.o. female who is being seen as a returning pt for diabetic pedal exam and evaluation of her interdigital maceration.  Patient states she has close follow-up with her PCP (Zari Pahceco MD). She states her last office visit with her PCP was 06/07/2024.  She describes her diabetes  as being under control.  She denies any overt pedal pain.  She denies any recent pedal trauma.  She denies any systemic signs of infection but does state she has very sweaty feet. She has been intermittently compliant with changing her socks and utilizing the foot powder.  She denies any other lower extremity  complaints           Past Medical History:   Diagnosis Date    Diabetes (HCC)     GERD (gastroesophageal reflux disease)     HTN (hypertension)     Hypercholesteremia     Morbid obesity (HCC)     Osteoarthritis     Sciatica     Sleep apnea     Vitamin D deficiency         Current Outpatient Medications:     fluticasone (FLONASE) 50 MCG/ACT nasal spray, 2 sprays by Nasal route daily, Disp: 16 g, Rfl: 4    cetirizine (ZYRTEC) 10 MG tablet, Take 1 tablet by mouth daily, Disp: 90 tablet, Rfl: 1    Semaglutide, 1 MG/DOSE, (OZEMPIC, 1 MG/DOSE,) 2 MG/1.5ML SOPN, Inject 1 mg into the skin every 7 days, Disp: 9 mL, Rfl: 2    pregabalin (LYRICA) 150 MG capsule, TAKE 1 CAPSULE BY MOUTH TWICE DAILY MAX  DAILY  AMOUNT  IS  2  CAPSULES, Disp: 180 capsule, Rfl: 1    lisinopril-hydroCHLOROthiazide (PRINZIDE;ZESTORETIC) 20-12.5 MG per tablet, TAKE 1 TABLET BY MOUTH ONCE DAILY IN THE MORNING, Disp: 90 tablet, Rfl: 2    metFORMIN (GLUCOPHAGE) 500 MG tablet, Take 1 tablet by mouth Daily with supper, Disp: 90 tablet, Rfl: 2    vitamin B-12 (CYANOCOBALAMIN) 1000 MCG tablet, Take 1 tablet by mouth daily, Disp: , Rfl:     aspirin 81 MG EC tablet, Take by mouth daily, Disp: , Rfl:     azaTHIOprine (IMURAN) 50 MG tablet, Take 1 tablet by mouth

## 2024-07-22 ENCOUNTER — TELEPHONE (OUTPATIENT)
Facility: CLINIC | Age: 79
End: 2024-07-22

## 2024-07-22 NOTE — TELEPHONE ENCOUNTER
Patient is requesting a refill on     azathioprine 50 mg Qty 180     Send to [facility].

## 2024-09-20 ENCOUNTER — HOSPITAL ENCOUNTER (OUTPATIENT)
Facility: HOSPITAL | Age: 79
Discharge: HOME | End: 2024-09-23
Attending: INTERNAL MEDICINE
Payer: MEDICARE

## 2024-09-20 DIAGNOSIS — Z12.31 VISIT FOR SCREENING MAMMOGRAM: ICD-10-CM

## 2024-09-20 PROCEDURE — 77063 BREAST TOMOSYNTHESIS BI: CPT

## 2024-11-04 ENCOUNTER — TELEPHONE (OUTPATIENT)
Facility: CLINIC | Age: 79
End: 2024-11-04

## 2024-11-04 SDOH — ECONOMIC STABILITY: TRANSPORTATION INSECURITY
IN THE PAST 12 MONTHS, HAS LACK OF TRANSPORTATION KEPT YOU FROM MEETINGS, WORK, OR FROM GETTING THINGS NEEDED FOR DAILY LIVING?: NO

## 2024-11-04 SDOH — ECONOMIC STABILITY: FOOD INSECURITY: WITHIN THE PAST 12 MONTHS, YOU WORRIED THAT YOUR FOOD WOULD RUN OUT BEFORE YOU GOT MONEY TO BUY MORE.: NEVER TRUE

## 2024-11-04 SDOH — ECONOMIC STABILITY: FOOD INSECURITY: WITHIN THE PAST 12 MONTHS, THE FOOD YOU BOUGHT JUST DIDN'T LAST AND YOU DIDN'T HAVE MONEY TO GET MORE.: NEVER TRUE

## 2024-11-04 SDOH — HEALTH STABILITY: PHYSICAL HEALTH: ON AVERAGE, HOW MANY DAYS PER WEEK DO YOU ENGAGE IN MODERATE TO STRENUOUS EXERCISE (LIKE A BRISK WALK)?: 0 DAYS

## 2024-11-04 SDOH — ECONOMIC STABILITY: INCOME INSECURITY: HOW HARD IS IT FOR YOU TO PAY FOR THE VERY BASICS LIKE FOOD, HOUSING, MEDICAL CARE, AND HEATING?: NOT HARD AT ALL

## 2024-11-04 ASSESSMENT — PATIENT HEALTH QUESTIONNAIRE - PHQ9
SUM OF ALL RESPONSES TO PHQ QUESTIONS 1-9: 0
2. FEELING DOWN, DEPRESSED OR HOPELESS: NOT AT ALL
1. LITTLE INTEREST OR PLEASURE IN DOING THINGS: NOT AT ALL
SUM OF ALL RESPONSES TO PHQ QUESTIONS 1-9: 0
SUM OF ALL RESPONSES TO PHQ9 QUESTIONS 1 & 2: 0

## 2024-11-04 ASSESSMENT — LIFESTYLE VARIABLES
HOW MANY STANDARD DRINKS CONTAINING ALCOHOL DO YOU HAVE ON A TYPICAL DAY: 0
HOW MANY STANDARD DRINKS CONTAINING ALCOHOL DO YOU HAVE ON A TYPICAL DAY: PATIENT DOES NOT DRINK
HOW OFTEN DO YOU HAVE A DRINK CONTAINING ALCOHOL: NEVER
HOW OFTEN DO YOU HAVE SIX OR MORE DRINKS ON ONE OCCASION: 1
HOW OFTEN DO YOU HAVE A DRINK CONTAINING ALCOHOL: 1

## 2024-11-07 ENCOUNTER — OFFICE VISIT (OUTPATIENT)
Facility: CLINIC | Age: 79
End: 2024-11-07

## 2024-11-07 VITALS
SYSTOLIC BLOOD PRESSURE: 120 MMHG | OXYGEN SATURATION: 94 % | HEART RATE: 80 BPM | BODY MASS INDEX: 43.61 KG/M2 | TEMPERATURE: 97.9 F | HEIGHT: 61 IN | WEIGHT: 231 LBS | RESPIRATION RATE: 18 BRPM | DIASTOLIC BLOOD PRESSURE: 52 MMHG

## 2024-11-07 DIAGNOSIS — E78.00 PURE HYPERCHOLESTEROLEMIA: ICD-10-CM

## 2024-11-07 DIAGNOSIS — M15.0 PRIMARY OSTEOARTHRITIS INVOLVING MULTIPLE JOINTS: ICD-10-CM

## 2024-11-07 DIAGNOSIS — Z78.9 STATIN INTOLERANCE: ICD-10-CM

## 2024-11-07 DIAGNOSIS — M35.3 POLYMYALGIA RHEUMATICA (HCC): ICD-10-CM

## 2024-11-07 DIAGNOSIS — M48.061 SPINAL STENOSIS OF LUMBAR REGION WITHOUT NEUROGENIC CLAUDICATION: ICD-10-CM

## 2024-11-07 DIAGNOSIS — E11.9 CONTROLLED TYPE 2 DIABETES MELLITUS WITHOUT COMPLICATION, WITHOUT LONG-TERM CURRENT USE OF INSULIN (HCC): ICD-10-CM

## 2024-11-07 DIAGNOSIS — I10 ESSENTIAL HYPERTENSION: ICD-10-CM

## 2024-11-07 DIAGNOSIS — G47.30 SLEEP APNEA, UNSPECIFIED TYPE: ICD-10-CM

## 2024-11-07 DIAGNOSIS — Z00.00 MEDICARE ANNUAL WELLNESS VISIT, SUBSEQUENT: ICD-10-CM

## 2024-11-07 DIAGNOSIS — J30.1 SEASONAL ALLERGIC RHINITIS DUE TO POLLEN: ICD-10-CM

## 2024-11-07 LAB
GLUCOSE, POC: 107 MG/DL
HBA1C MFR BLD: 5.8 %

## 2024-11-07 RX ORDER — SEMAGLUTIDE 1.34 MG/ML
1 INJECTION, SOLUTION SUBCUTANEOUS
COMMUNITY
Start: 2024-10-26 | End: 2024-11-07

## 2024-11-07 RX ORDER — LISINOPRIL AND HYDROCHLOROTHIAZIDE 10; 12.5 MG/1; MG/1
1 TABLET ORAL DAILY
Qty: 90 TABLET | Refills: 1 | Status: SHIPPED | OUTPATIENT
Start: 2024-11-07

## 2024-11-07 ASSESSMENT — ENCOUNTER SYMPTOMS
ALLERGIC/IMMUNOLOGIC NEGATIVE: 1
RESPIRATORY NEGATIVE: 1
GASTROINTESTINAL NEGATIVE: 1

## 2024-11-07 NOTE — PROGRESS NOTES
\"Have you been to the ER, urgent care clinic since your last visit?  Hospitalized since your last visit?\"    NO    “Have you seen or consulted any other health care providers outside of [facility] since your last visit?”    YES - When: approximately 2 weeks ago.  Where and Why: [facility] for follow up visit.    Chief Complaint   Patient presents with    Medicare AW    Follow-up Chronic Condition     BP (!) 123/52 (Site: Left Upper Arm, Position: Sitting, Cuff Size: Medium Adult)   Pulse 79   Temp 97.9 °F (36.6 °C) (Temporal)   Resp 18   Ht 1.549 m (5' 1\")   Wt 104.8 kg (231 lb)   LMP  (LMP Unknown)   SpO2 94%   BMI 43.65 kg/m²           Click Here for Release of Records Request

## 2024-11-07 NOTE — PROGRESS NOTES
Medicare Annual Wellness Visit    Chetna Bowling is here for Medicare AWV and Follow-up Chronic Condition    Assessment & Plan   Medicare annual wellness visit, subsequent  Essential hypertension  Controlled type 2 diabetes mellitus without complication, without long-term current use of insulin (HCC)  -     AMB POC HEMOGLOBIN A1C  -     AMB POC GLUCOSE BLOOD, BY GLUCOSE MONITORING DEVICE  -     Microalbumin / Creatinine Urine Ratio; Future  Spinal stenosis of lumbar region without neurogenic claudication  Statin intolerance  Polymyalgia rheumatica (HCC)  Primary osteoarthritis involving multiple joints  Pure hypercholesterolemia  Sleep apnea, unspecified type  Seasonal allergic rhinitis due to pollen  BMI 40.0-44.9, adult    Recommendations for Preventive Services Due: see orders and patient instructions/AVS.  Recommended screening schedule for the next 5-10 years is provided to the patient in written form: see Patient Instructions/AVS.    Ms. Bowling came for combined visit.  She says she is getting Ozempic and she has weight loss from 248 to 231 pound and I have complemented her and she received a letter from [employer] that she needs prior Auth for getting Ozempic for 2025 and my MA Ms. Knight is going to do prior Auth.    Type 2 diabetes mellitus, random blood sugar 107.  Hemoglobin A1c improved to 5.8%.  Continue healthy diet continue current dose of metformin and Ozempic and she follows ophthalmologist and she cannot walk much because of lumbar spinal stenosis and DJD and polymyalgia rheumatica and continue low-dose aspirin and cannot take statin    Hypertension running on lower side of normal range so dose of lisinopril HCTZ 10/12.5 started instead of 20/12.5 mg/day new dose sent to the pharmacy.  Keep blood pressure monitoring at home.  If blood pressure spikes more than 140/80 I will start back on 20/12.5 mg/day clarified her.  Diet low in sodium and DASH diet.  She has no dizziness and she is completely

## 2024-11-13 DIAGNOSIS — M48.061 SPINAL STENOSIS, LUMBAR REGION WITHOUT NEUROGENIC CLAUDICATION: ICD-10-CM

## 2024-11-15 RX ORDER — PREGABALIN 150 MG/1
150 CAPSULE ORAL
Qty: 90 CAPSULE | Refills: 1 | Status: SHIPPED | OUTPATIENT
Start: 2024-11-15 | End: 2025-05-14

## 2024-12-04 ENCOUNTER — TELEPHONE (OUTPATIENT)
Facility: CLINIC | Age: 79
End: 2024-12-04

## 2024-12-07 PROBLEM — Z00.00 MEDICARE ANNUAL WELLNESS VISIT, SUBSEQUENT: Status: RESOLVED | Noted: 2024-11-07 | Resolved: 2024-12-07

## 2024-12-28 RX ORDER — CETIRIZINE HYDROCHLORIDE 10 MG/1
10 TABLET ORAL DAILY
Qty: 90 TABLET | Refills: 1 | Status: SHIPPED | OUTPATIENT
Start: 2024-12-28

## 2025-01-07 DIAGNOSIS — M48.061 SPINAL STENOSIS, LUMBAR REGION WITHOUT NEUROGENIC CLAUDICATION: ICD-10-CM

## 2025-01-07 NOTE — TELEPHONE ENCOUNTER
Patient is requesting a refill on       Pregabalin 150 mg       She stated that Dr. Pacheco changed her to 2 times a day.     Send to [facility]

## 2025-01-09 RX ORDER — PREGABALIN 150 MG/1
150 CAPSULE ORAL 2 TIMES DAILY
Qty: 180 CAPSULE | Refills: 1 | Status: SHIPPED | OUTPATIENT
Start: 2025-01-09 | End: 2025-07-08

## 2025-01-23 RX ORDER — SEMAGLUTIDE 1.34 MG/ML
1 INJECTION, SOLUTION SUBCUTANEOUS
Qty: 9 ML | Refills: 2 | Status: CANCELLED | OUTPATIENT
Start: 2025-01-23 | End: 2025-07-22

## 2025-01-30 DIAGNOSIS — E11.9 CONTROLLED TYPE 2 DIABETES MELLITUS WITHOUT COMPLICATION, WITHOUT LONG-TERM CURRENT USE OF INSULIN (HCC): Primary | ICD-10-CM

## 2025-01-30 NOTE — TELEPHONE ENCOUNTER
Patient is requesting a refill on         Semaglutide, 1 MG/DOSE, (OZEMPIC, 1 MG/DOSE,) 2 MG/1.5ML       Send to [facility] on [address]

## 2025-03-13 ENCOUNTER — OFFICE VISIT (OUTPATIENT)
Facility: CLINIC | Age: 80
End: 2025-03-13

## 2025-03-13 VITALS
SYSTOLIC BLOOD PRESSURE: 134 MMHG | WEIGHT: 268.2 LBS | DIASTOLIC BLOOD PRESSURE: 60 MMHG | BODY MASS INDEX: 50.63 KG/M2 | HEIGHT: 61 IN | RESPIRATION RATE: 18 BRPM | TEMPERATURE: 97.8 F | OXYGEN SATURATION: 98 % | HEART RATE: 68 BPM

## 2025-03-13 DIAGNOSIS — Z78.9 STATIN INTOLERANCE: ICD-10-CM

## 2025-03-13 DIAGNOSIS — E55.9 VITAMIN D DEFICIENCY: ICD-10-CM

## 2025-03-13 DIAGNOSIS — E11.9 CONTROLLED TYPE 2 DIABETES MELLITUS WITHOUT COMPLICATION, WITHOUT LONG-TERM CURRENT USE OF INSULIN (HCC): ICD-10-CM

## 2025-03-13 DIAGNOSIS — I10 ESSENTIAL HYPERTENSION: ICD-10-CM

## 2025-03-13 DIAGNOSIS — M15.0 PRIMARY OSTEOARTHRITIS INVOLVING MULTIPLE JOINTS: ICD-10-CM

## 2025-03-13 DIAGNOSIS — M35.3 POLYMYALGIA RHEUMATICA: ICD-10-CM

## 2025-03-13 DIAGNOSIS — G47.30 SLEEP APNEA, UNSPECIFIED TYPE: ICD-10-CM

## 2025-03-13 DIAGNOSIS — J30.1 SEASONAL ALLERGIC RHINITIS DUE TO POLLEN: ICD-10-CM

## 2025-03-13 DIAGNOSIS — E78.00 PURE HYPERCHOLESTEROLEMIA: ICD-10-CM

## 2025-03-13 DIAGNOSIS — M48.061 SPINAL STENOSIS OF LUMBAR REGION WITHOUT NEUROGENIC CLAUDICATION: ICD-10-CM

## 2025-03-13 DIAGNOSIS — E11.9 CONTROLLED TYPE 2 DIABETES MELLITUS WITHOUT COMPLICATION, WITHOUT LONG-TERM CURRENT USE OF INSULIN: ICD-10-CM

## 2025-03-13 LAB
GLUCOSE, POC: 112 MG/DL
HBA1C MFR BLD: 6.6 %

## 2025-03-13 RX ORDER — FLUTICASONE PROPIONATE 50 MCG
2 SPRAY, SUSPENSION (ML) NASAL DAILY
Qty: 48 G | Refills: 2 | Status: SHIPPED | OUTPATIENT
Start: 2025-03-13

## 2025-03-13 RX ORDER — CETIRIZINE HYDROCHLORIDE 10 MG/1
10 TABLET ORAL DAILY
Qty: 90 TABLET | Refills: 2 | Status: SHIPPED | OUTPATIENT
Start: 2025-03-13

## 2025-03-13 RX ORDER — LISINOPRIL AND HYDROCHLOROTHIAZIDE 10; 12.5 MG/1; MG/1
1 TABLET ORAL EVERY MORNING
Qty: 90 TABLET | Refills: 2 | Status: SHIPPED | OUTPATIENT
Start: 2025-03-13

## 2025-03-13 SDOH — ECONOMIC STABILITY: FOOD INSECURITY: WITHIN THE PAST 12 MONTHS, YOU WORRIED THAT YOUR FOOD WOULD RUN OUT BEFORE YOU GOT MONEY TO BUY MORE.: NEVER TRUE

## 2025-03-13 SDOH — ECONOMIC STABILITY: FOOD INSECURITY: WITHIN THE PAST 12 MONTHS, THE FOOD YOU BOUGHT JUST DIDN'T LAST AND YOU DIDN'T HAVE MONEY TO GET MORE.: NEVER TRUE

## 2025-03-13 ASSESSMENT — ENCOUNTER SYMPTOMS
RESPIRATORY NEGATIVE: 1
GASTROINTESTINAL NEGATIVE: 1
ALLERGIC/IMMUNOLOGIC NEGATIVE: 1

## 2025-03-13 ASSESSMENT — PATIENT HEALTH QUESTIONNAIRE - PHQ9
SUM OF ALL RESPONSES TO PHQ QUESTIONS 1-9: 0
1. LITTLE INTEREST OR PLEASURE IN DOING THINGS: NOT AT ALL
SUM OF ALL RESPONSES TO PHQ QUESTIONS 1-9: 0
2. FEELING DOWN, DEPRESSED OR HOPELESS: NOT AT ALL

## 2025-03-13 NOTE — PROGRESS NOTES
Chief Complaint   Patient presents with    Follow-up Chronic Condition     /64 (BP Site: Left Upper Arm, Patient Position: Sitting)   Pulse 67   Temp 97.8 °F (36.6 °C) (Oral)   Resp 18   Ht 1.549 m (5' 0.98\")   Wt 121.7 kg (268 lb 3.2 oz)   LMP  (LMP Unknown)   SpO2 98%   BMI 50.70 kg/m²           \"Have you been to the ER, urgent care clinic since your last visit?  Hospitalized since your last visit?\"    NO    “Have you seen or consulted any other health care providers outside our system since your last visit?”    NO

## 2025-03-13 NOTE — PROGRESS NOTES
Chetna Bowling (: 1945) is a 79 y.o. female, Established patient patient, here for evaluation of the following chief complaint(s):  Follow-up Chronic Condition         ASSESSMENT/PLAN:  Below is the assessment and plan developed based on review of pertinent history, physical exam, labs, studies, and medications.      1. Essential hypertension  -     Comprehensive Metabolic Panel; Future  -     CBC with Auto Differential; Future  2. Controlled type 2 diabetes mellitus without complication, without long-term current use of insulin (HCC)  -     AMB POC HEMOGLOBIN A1C  -     AMB POC GLUCOSE BLOOD, BY GLUCOSE MONITORING DEVICE  -     Comprehensive Metabolic Panel; Future  -     CBC with Auto Differential; Future  -     Albumin/Creatinine Ratio, Urine; Future  3. Pure hypercholesterolemia  -     Lipid Panel; Future  4. Polymyalgia rheumatica  5. Seasonal allergic rhinitis due to pollen  6. Statin intolerance  7. Primary osteoarthritis involving multiple joints  8. Sleep apnea, unspecified type  9. Spinal stenosis of lumbar region without neurogenic claudication  10. Vitamin D deficiency  -     Vitamin D 25 Hydroxy; Future    Hypertension, controlled, continue current dose of lisinopril HCTZ.  Diet low in sodium.    Type 2 diabetes mellitus, well-controlled, non-insulin-dependent,    Now she is in prediabetic stage with hemoglobin A1c 6% today in the office and random blood sugar 112 in the office complemented her.  And she is not getting her GLP-1 receptor agonist as insurance is not covering and she says she feels much better having no GI problems and constipation and she is taking metformin.    She follows ophthalmologist.  Diet low in sugar starch.  She cannot take statin.  Continue low-dose aspirin.    Polymyalgia rheumatica.  Under the care of rheumatologist Dr. Forte.  Now she is off from azathioprine.  And she is feeling much better no muscular weakness.  She is getting diclofenac 50 mg once a day as

## 2025-04-28 ENCOUNTER — TELEPHONE (OUTPATIENT)
Facility: CLINIC | Age: 80
End: 2025-04-28

## 2025-04-28 DIAGNOSIS — M48.061 SPINAL STENOSIS, LUMBAR REGION WITHOUT NEUROGENIC CLAUDICATION: ICD-10-CM

## 2025-04-28 RX ORDER — PREGABALIN 150 MG/1
150 CAPSULE ORAL 2 TIMES DAILY
Qty: 180 CAPSULE | Refills: 1 | Status: SHIPPED | OUTPATIENT
Start: 2025-04-28 | End: 2025-10-25

## 2025-04-28 NOTE — TELEPHONE ENCOUNTER
Patient is requesting a refill for        pregabalin (LYRICA) 150 MG capsule     Send to [facility]

## 2025-07-01 ENCOUNTER — RESULTS FOLLOW-UP (OUTPATIENT)
Facility: CLINIC | Age: 80
End: 2025-07-01

## 2025-07-01 LAB
25(OH)D3+25(OH)D2 SERPL-MCNC: 49.8 NG/ML (ref 30–100)
ALBUMIN SERPL-MCNC: 4.4 G/DL (ref 3.8–4.8)
ALP SERPL-CCNC: 101 IU/L (ref 44–121)
ALT SERPL-CCNC: 19 IU/L (ref 0–32)
AST SERPL-CCNC: 21 IU/L (ref 0–40)
BASOPHILS # BLD AUTO: 0 X10E3/UL (ref 0–0.2)
BASOPHILS NFR BLD AUTO: 1 %
BILIRUB SERPL-MCNC: 0.2 MG/DL (ref 0–1.2)
BUN SERPL-MCNC: 25 MG/DL (ref 8–27)
BUN/CREAT SERPL: 28 (ref 12–28)
CALCIUM SERPL-MCNC: 9.7 MG/DL (ref 8.7–10.3)
CHLORIDE SERPL-SCNC: 104 MMOL/L (ref 96–106)
CHOLEST SERPL-MCNC: 235 MG/DL (ref 100–199)
CO2 SERPL-SCNC: 20 MMOL/L (ref 20–29)
CREAT SERPL-MCNC: 0.89 MG/DL (ref 0.57–1)
EGFRCR SERPLBLD CKD-EPI 2021: 65 ML/MIN/1.73
EOSINOPHIL # BLD AUTO: 0.2 X10E3/UL (ref 0–0.4)
EOSINOPHIL NFR BLD AUTO: 3 %
ERYTHROCYTE [DISTWIDTH] IN BLOOD BY AUTOMATED COUNT: 14 % (ref 11.7–15.4)
GLOBULIN SER CALC-MCNC: 2.4 G/DL (ref 1.5–4.5)
GLUCOSE SERPL-MCNC: 101 MG/DL (ref 70–99)
HCT VFR BLD AUTO: 38.1 % (ref 34–46.6)
HDLC SERPL-MCNC: 86 MG/DL
HGB BLD-MCNC: 11.5 G/DL (ref 11.1–15.9)
IMM GRANULOCYTES # BLD AUTO: 0 X10E3/UL (ref 0–0.1)
IMM GRANULOCYTES NFR BLD AUTO: 0 %
LDLC SERPL CALC-MCNC: 129 MG/DL (ref 0–99)
LYMPHOCYTES # BLD AUTO: 2.4 X10E3/UL (ref 0.7–3.1)
LYMPHOCYTES NFR BLD AUTO: 38 %
MCH RBC QN AUTO: 26.5 PG (ref 26.6–33)
MCHC RBC AUTO-ENTMCNC: 30.2 G/DL (ref 31.5–35.7)
MCV RBC AUTO: 88 FL (ref 79–97)
MONOCYTES # BLD AUTO: 0.7 X10E3/UL (ref 0.1–0.9)
MONOCYTES NFR BLD AUTO: 10 %
NEUTROPHILS # BLD AUTO: 3 X10E3/UL (ref 1.4–7)
NEUTROPHILS NFR BLD AUTO: 48 %
PLATELET # BLD AUTO: 274 X10E3/UL (ref 150–450)
POTASSIUM SERPL-SCNC: 4.5 MMOL/L (ref 3.5–5.2)
PROT SERPL-MCNC: 6.8 G/DL (ref 6–8.5)
RBC # BLD AUTO: 4.34 X10E6/UL (ref 3.77–5.28)
SODIUM SERPL-SCNC: 146 MMOL/L (ref 134–144)
TRIGL SERPL-MCNC: 116 MG/DL (ref 0–149)
VLDLC SERPL CALC-MCNC: 20 MG/DL (ref 5–40)
WBC # BLD AUTO: 6.3 X10E3/UL (ref 3.4–10.8)

## 2025-07-02 LAB
ALBUMIN/CREAT UR: 8 MG/G CREAT (ref 0–29)
CREAT UR-MCNC: 97.9 MG/DL
MICROALBUMIN UR-MCNC: 7.6 UG/ML

## 2025-07-06 PROBLEM — M54.30 CHRONIC SCIATICA: Status: RESOLVED | Noted: 2020-07-23 | Resolved: 2025-07-06

## 2025-07-09 ENCOUNTER — TELEPHONE (OUTPATIENT)
Facility: CLINIC | Age: 80
End: 2025-07-09

## 2025-07-09 NOTE — TELEPHONE ENCOUNTER
Attempted to contact patient regarding upcoming Medicare wellness appointment and completion of HRA questionnaire. LVM for patient to please return call at [phone].

## 2025-07-10 ENCOUNTER — OFFICE VISIT (OUTPATIENT)
Facility: CLINIC | Age: 80
End: 2025-07-10
Payer: MEDICARE

## 2025-07-10 VITALS
OXYGEN SATURATION: 98 % | TEMPERATURE: 97.8 F | RESPIRATION RATE: 16 BRPM | BODY MASS INDEX: 38.82 KG/M2 | DIASTOLIC BLOOD PRESSURE: 64 MMHG | WEIGHT: 233 LBS | HEART RATE: 72 BPM | SYSTOLIC BLOOD PRESSURE: 136 MMHG | HEIGHT: 65 IN

## 2025-07-10 DIAGNOSIS — Z78.9 STATIN INTOLERANCE: ICD-10-CM

## 2025-07-10 DIAGNOSIS — I10 ESSENTIAL HYPERTENSION: ICD-10-CM

## 2025-07-10 DIAGNOSIS — J30.1 SEASONAL ALLERGIC RHINITIS DUE TO POLLEN: ICD-10-CM

## 2025-07-10 DIAGNOSIS — G47.30 SLEEP APNEA, UNSPECIFIED TYPE: ICD-10-CM

## 2025-07-10 DIAGNOSIS — M15.0 PRIMARY OSTEOARTHRITIS INVOLVING MULTIPLE JOINTS: ICD-10-CM

## 2025-07-10 DIAGNOSIS — E78.00 PURE HYPERCHOLESTEROLEMIA: ICD-10-CM

## 2025-07-10 DIAGNOSIS — Z00.00 MEDICARE ANNUAL WELLNESS VISIT, SUBSEQUENT: ICD-10-CM

## 2025-07-10 DIAGNOSIS — M48.061 SPINAL STENOSIS OF LUMBAR REGION WITHOUT NEUROGENIC CLAUDICATION: ICD-10-CM

## 2025-07-10 DIAGNOSIS — M35.3 POLYMYALGIA RHEUMATICA: ICD-10-CM

## 2025-07-10 DIAGNOSIS — T39.395A NSAID INDUCED GASTRITIS: ICD-10-CM

## 2025-07-10 DIAGNOSIS — E11.9 CONTROLLED TYPE 2 DIABETES MELLITUS WITHOUT COMPLICATION, WITHOUT LONG-TERM CURRENT USE OF INSULIN (HCC): ICD-10-CM

## 2025-07-10 DIAGNOSIS — K29.60 NSAID INDUCED GASTRITIS: ICD-10-CM

## 2025-07-10 PROCEDURE — 1159F MED LIST DOCD IN RCRD: CPT | Performed by: INTERNAL MEDICINE

## 2025-07-10 PROCEDURE — 3078F DIAST BP <80 MM HG: CPT | Performed by: INTERNAL MEDICINE

## 2025-07-10 PROCEDURE — G0439 PPPS, SUBSEQ VISIT: HCPCS | Performed by: INTERNAL MEDICINE

## 2025-07-10 PROCEDURE — 99214 OFFICE O/P EST MOD 30 MIN: CPT | Performed by: INTERNAL MEDICINE

## 2025-07-10 PROCEDURE — 3075F SYST BP GE 130 - 139MM HG: CPT | Performed by: INTERNAL MEDICINE

## 2025-07-10 PROCEDURE — 1126F AMNT PAIN NOTED NONE PRSNT: CPT | Performed by: INTERNAL MEDICINE

## 2025-07-10 PROCEDURE — 3044F HG A1C LEVEL LT 7.0%: CPT | Performed by: INTERNAL MEDICINE

## 2025-07-10 PROCEDURE — 1160F RVW MEDS BY RX/DR IN RCRD: CPT | Performed by: INTERNAL MEDICINE

## 2025-07-10 PROCEDURE — 1123F ACP DISCUSS/DSCN MKR DOCD: CPT | Performed by: INTERNAL MEDICINE

## 2025-07-10 RX ORDER — OMEPRAZOLE 40 MG/1
40 CAPSULE, DELAYED RELEASE ORAL ONCE AS NEEDED
Qty: 30 CAPSULE | Refills: 0 | Status: SHIPPED | OUTPATIENT
Start: 2025-07-10

## 2025-07-10 RX ORDER — FERROUS SULFATE 325(65) MG
1 TABLET ORAL DAILY
COMMUNITY
Start: 2025-06-19

## 2025-07-10 ASSESSMENT — PATIENT HEALTH QUESTIONNAIRE - PHQ9
SUM OF ALL RESPONSES TO PHQ QUESTIONS 1-9: 0
1. LITTLE INTEREST OR PLEASURE IN DOING THINGS: NOT AT ALL
2. FEELING DOWN, DEPRESSED OR HOPELESS: NOT AT ALL

## 2025-07-10 ASSESSMENT — ENCOUNTER SYMPTOMS
RESPIRATORY NEGATIVE: 1
ALLERGIC/IMMUNOLOGIC NEGATIVE: 1
GASTROINTESTINAL NEGATIVE: 1

## 2025-07-10 ASSESSMENT — LIFESTYLE VARIABLES
HOW MANY STANDARD DRINKS CONTAINING ALCOHOL DO YOU HAVE ON A TYPICAL DAY: PATIENT DOES NOT DRINK
HOW OFTEN DO YOU HAVE A DRINK CONTAINING ALCOHOL: NEVER

## 2025-07-10 NOTE — PATIENT INSTRUCTIONS
participating in exercise and having meals with you, even if they may be eating something different.  Find what works best for you. If you do not have time or do not like to cook, a program that offers meal replacement bars or shakes may be better for you. Or if you like to prepare meals, finding a plan that includes daily menus and recipes may be best.  Ask your doctor about other health professionals who can help you achieve your weight-loss goals.  A dietitian can help you make healthy changes in your diet.  An exercise specialist or  can help you develop a safe and effective exercise program.  A counselor or psychiatrist can help you cope with issues such as depression, anxiety, or family problems that can make it hard to focus on weight loss.  Consider joining a support group for people who are trying to lose weight. Your doctor can suggest groups in your area.  Where can you learn more?  Go to https://www.[employer]/patientEd and enter U357 to learn more about \"Starting a Weight-Loss Plan: Care Instructions.\"  Current as of: April 30, 2024  Content Version: 14.5  © 5397-5165 [employer].   Care instructions adapted under license by [employer]. If you have questions about a medical condition or this instruction, always ask your healthcare professional. [email or web address] disclaims any warranty or liability for your use of this information.         A Healthy Heart: Care Instructions  Overview     Coronary artery disease, also called heart disease, occurs when a substance called plaque builds up in the vessels that supply oxygen-rich blood to your heart muscle. This can narrow the blood vessels and reduce blood flow. A heart attack happens when blood flow is completely blocked. A high-fat diet, smoking, and other factors increase the risk of heart disease.  Your doctor has found that you have a chance of having heart disease. A heart-healthy lifestyle can help keep your heart

## 2025-07-10 NOTE — PROGRESS NOTES
Medicare Annual Wellness Visit    Chetna Bowling is here for Medicare AWV and Follow-up Chronic Condition    Assessment & Plan   Medicare annual wellness visit, subsequent  Essential hypertension  Controlled type 2 diabetes mellitus without complication, without long-term current use of insulin (HCC)  Pure hypercholesterolemia  Statin intolerance  Spinal stenosis of lumbar region without neurogenic claudication  Primary osteoarthritis involving multiple joints  Sleep apnea, unspecified type  BMI 40.0-44.9, adult (HCC)  Polymyalgia rheumatica  Seasonal allergic rhinitis due to pollen  NSAID induced gastritis  -     omeprazole (PRILOSEC) 40 MG delayed release capsule; Take 1 capsule by mouth 1 (one) time if needed (gastritis prevention due to NSAID), Disp-30 capsule, R-0Normal    Ms. Bowling having pleasant personality came for combined visit.  She follows rheumatologist Dr. Forte and she says that Tylenol does not help in her joint pain and she has been told by Dr. Forte to take diclofenac 50 mg once a day and if she has severe pain she can take twice a day.  She has no GI upset.  She she has done her labs and I reviewed discussed with her.  Her renal function is normal potassium is normal.  I gave her standby PPI in case and side causes side effects and she agreed.    Hypertension, well-controlled, continue current dose o lisinopril HCTZf.  Diet low in sodium.    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

## 2025-07-10 NOTE — PROGRESS NOTES
Chief Complaint   Patient presents with    Medicare AWV    Follow-up Chronic Condition       /64 (BP Site: Left Upper Arm, Patient Position: Sitting)   Pulse 68   Temp 97.8 °F (36.6 °C) (Oral)   Resp 16   Ht 1.651 m (5' 5\")   Wt 105.7 kg (233 lb)   LMP  (LMP Unknown)   SpO2 98%   BMI 38.77 kg/m²         Have you been to the ER, urgent care clinic since your last visit?  Hospitalized since your last visit?   NO    Have you seen or consulted any other health care providers outside our system since your last visit?   NO

## 2025-07-24 ENCOUNTER — CLINICAL SUPPORT (OUTPATIENT)
Facility: CLINIC | Age: 80
End: 2025-07-24
Payer: MEDICARE

## 2025-07-24 VITALS — DIASTOLIC BLOOD PRESSURE: 77 MMHG | SYSTOLIC BLOOD PRESSURE: 136 MMHG | HEART RATE: 69 BPM

## 2025-07-24 DIAGNOSIS — Z00.00 MEDICARE ANNUAL WELLNESS VISIT, SUBSEQUENT: Primary | ICD-10-CM

## 2025-07-24 LAB — HBA1C MFR BLD: 6.2 %

## 2025-07-24 PROCEDURE — 83036 HEMOGLOBIN GLYCOSYLATED A1C: CPT | Performed by: STUDENT IN AN ORGANIZED HEALTH CARE EDUCATION/TRAINING PROGRAM
